# Patient Record
Sex: FEMALE | Race: WHITE | NOT HISPANIC OR LATINO | Employment: FULL TIME
[De-identification: names, ages, dates, MRNs, and addresses within clinical notes are randomized per-mention and may not be internally consistent; named-entity substitution may affect disease eponyms.]

---

## 2017-08-28 ENCOUNTER — RECORDS - HEALTHEAST (OUTPATIENT)
Dept: ADMINISTRATIVE | Facility: OTHER | Age: 32
End: 2017-08-28

## 2017-09-15 ENCOUNTER — TRANSFERRED RECORDS (OUTPATIENT)
Dept: HEALTH INFORMATION MANAGEMENT | Facility: CLINIC | Age: 32
End: 2017-09-15

## 2017-09-18 ENCOUNTER — HOSPITAL ENCOUNTER (OUTPATIENT)
Dept: BEHAVIORAL HEALTH | Facility: CLINIC | Age: 32
End: 2017-09-18
Attending: PSYCHIATRY & NEUROLOGY
Payer: MEDICAID

## 2017-09-18 PROCEDURE — 10020000 ZZH LODGING PLUS FACILITY CHARGE ADULT

## 2017-09-18 RX ORDER — LORATADINE 10 MG/1
10 TABLET ORAL DAILY PRN
COMMUNITY
End: 2017-10-14

## 2017-09-18 RX ORDER — MAGNESIUM HYDROXIDE/ALUMINUM HYDROXICE/SIMETHICONE 120; 1200; 1200 MG/30ML; MG/30ML; MG/30ML
30 SUSPENSION ORAL EVERY 6 HOURS PRN
COMMUNITY
End: 2017-10-14

## 2017-09-18 RX ORDER — METHYLPREDNISOLONE 4 MG
32 TABLET, DOSE PACK ORAL
COMMUNITY
End: 2017-10-14

## 2017-09-18 RX ORDER — LACTULOSE 10 G/15ML
30 SOLUTION ORAL 2 TIMES DAILY PRN
COMMUNITY
End: 2017-10-14

## 2017-09-18 RX ORDER — OMEPRAZOLE 40 MG/1
40 CAPSULE, DELAYED RELEASE ORAL
COMMUNITY
End: 2018-06-09

## 2017-09-18 RX ORDER — AMOXICILLIN 250 MG
2 CAPSULE ORAL DAILY PRN
COMMUNITY
End: 2017-10-14

## 2017-09-18 RX ORDER — LACTULOSE 10 G/15ML
10 SOLUTION ORAL
COMMUNITY
End: 2017-10-14

## 2017-09-18 ASSESSMENT — ANXIETY QUESTIONNAIRES
4. TROUBLE RELAXING: SEVERAL DAYS
6. BECOMING EASILY ANNOYED OR IRRITABLE: SEVERAL DAYS
7. FEELING AFRAID AS IF SOMETHING AWFUL MIGHT HAPPEN: MORE THAN HALF THE DAYS
GAD7 TOTAL SCORE: 8
5. BEING SO RESTLESS THAT IT IS HARD TO SIT STILL: SEVERAL DAYS
1. FEELING NERVOUS, ANXIOUS, OR ON EDGE: SEVERAL DAYS
3. WORRYING TOO MUCH ABOUT DIFFERENT THINGS: SEVERAL DAYS
2. NOT BEING ABLE TO STOP OR CONTROL WORRYING: SEVERAL DAYS

## 2017-09-18 ASSESSMENT — PAIN SCALES - GENERAL: PAINLEVEL: NO PAIN (0)

## 2017-09-18 ASSESSMENT — PATIENT HEALTH QUESTIONNAIRE - PHQ9: SUM OF ALL RESPONSES TO PHQ QUESTIONS 1-9: 11
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
## 2017-09-18 NOTE — PROGRESS NOTES
Name: Tiffany Vargas  Date: 9/18/2017  Medical Record: [id number]    Envelope Number: [id number]    List of Contents (List each item separately in new row):   Cell phone    Admission:  I am responsible for any personal items that are not sent to the safe or pharmacy.  [facility] is not responsible for loss, theft or damage of any property in my possession.      Patient Signature:  ___________________________________________       Date/Time:__________________________    Staff Signature: __________________________________       Date/Time:__________________________    2nd Staff person, if patient is unable/unwilling to sign:      __________________________________________________________       Date/Time: __________________________      Discharge:  [facility] has returned all of my personal belongings:    Patient Signature: ________________________________________     Date/Time: ____________________________________    Staff Signature: ______________________________________     Date/Time:_____________________________________\

## 2017-09-18 NOTE — PROGRESS NOTES
9/18/2017 I did have a tel call with her  of over a year, they were  2/10/16. He, is 50, and been sober since the age of 22. He has been concerned about her drinking since they met. He has been trying to get her to stop, she slows down now and then but then resumes her normal pattern which is about 1/2 gallon vodka over 3 days. He said she has high tolerance and doesn't show it. If he has advanced notification he would like to come to family week. He needs several days prior notification so that he can change his truck route to be in town for her family week. He said her father, a dentist, is also very supportive. However, she gets discouraged whenever she talks to him because he tells her she will die if she keeps drinking. A)  sounds very supportive P) Please notify  as soon as possible about the dates for family week. Ramy FERRER, [facility]
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
## 2017-09-18 NOTE — PROGRESS NOTES
[facility] ADMISSION UPDATE       Date of update: Update Evaluation Counselor:   9/18/2017     Ramy Burnham, [facility]     Date of original CD evaluation: Original Evaluation Counselor:   9/18/2017     Ramy Burnham     PHQ Score 10 or greater:   Yes  11       JOCE-7 Score 10 or greater:   No  8         Suicide Screening Questions:   1. Are you feeling hopeless about the present/future?     Yes, If yes explain: She has fears that she might die from her liver disease.    2. Have you ever had thoughts about taking your life?     No   3. When did you have these thoughts?     NA   4. Do you have any current intent or active desire to take your life?     No   5. Do you have a plan to take your life?     No   6. Have you ever made a suicide attempt?     No   7. Do you have access to pills, guns or other methods to kill yourself?     No     Guide to Risk Ratings   IDEATION: Active thoughts of suicide? INTENT: Intent to follow on suicide? PLAN: Plan to follow through on suicide? Level of Risk:   IF Yes Yes Yes Patient = High Emergent   IF Yes Yes No Patient = High Urgent/Non-Emergent   IF Yes No No Patient = Moderate Non-Urgent   IF No No   No Patient = Low Risk   The patient's ADDITIONAL RISK FACTORS and lack of PROTECTIVE FACTORS may increase their overall suicide risk ratings.     Patient's Responses (within the last 30 days)   IDEATION: Active thoughts of suicide?    No     INTENT: Intent to follow on suicide?    No     PLAN: Plan to follow through on suicide?    No     Determining the level of risk depends on the patient responses, suicide risk factors and protective factors.         Additional Risk Factors: Significant history of having untreated or poorly treated mental health symptoms  Significant history of physical illness or chronic medical problems  Tendency to be socially isolated and/or cut off from the support of others  Significant history of trauma and/or abuse issues  No permanent address. Lives with her   in the sleeper cab of his over the road semi.   Protective Factors:  She lives with her  of one year who is 51, an over road , whom she said is supportive. But they have lived in the same truck for the last year and he didn't appear concerned that she was drinking 1/5th bottle of HL a day.         Current AMARA: Current UA:     .000       Negative for all screened drugs.         Alcohol/Drug use since the last CD evaluation (include date and time of last use):     No additional substances use since the last CD evaluation       Please note any other clinical changes since the last CD evaluation (such as medication changes, additional legal charges, detoxification admissions, overdoses, etc.)     No significant changes since the last CD evaluation         Current ASAM Dimensions   I.) Intoxication and Withdrawal: 0   II.) Biomedical:  2   III.) Emotional and Behavioral:  2   IV.) Readiness to Change:  1   V.) Relapse Potential: 4   VI.) Recovery Environmental: 4

## 2017-09-18 NOTE — PROGRESS NOTES
Initial Services Plan        Before your first treatment group, please do the following    Immediate health & safety concerns: Go to the emergency room if you start to have withdrawal symptoms.  Look for sober housing and a supportive social network.  Look for a support network (such as AA, NA, DBT group, a Rastafarian group, etc.)    Suggestions for client during the time between intake & completion of treatment plan:  Tour your treatment center (unit or outpatient clinic).  Introduce yourself to the treatment group.  Spend time getting to know your peers.  Review your patient or client handbook.    Client issues to be addressed in the first treatment sessions:  Other Talk about her concerns that she may die because of her liver disease.       LUPE Hanks  9/18/2017  7:35 AM

## 2017-09-18 NOTE — PROGRESS NOTES
Name: Tiffany Vargas  Date: 9/18/2017  Medical Record: [id number]    Envelope Number: [id number]    List of Contents (List each item separately in new row):   Methylprednisone 8mg Tabs.     Admission:  I am responsible for any personal items that are not sent to the safe or pharmacy.  [facility] is not responsible for loss, theft or damage of any property in my possession.      Patient Signature:  ___________________________________________       Date/Time:__________________________    Staff Signature: __________________________________       Date/Time:__________________________    2nd Staff person, if patient is unable/unwilling to sign:      __________________________________________________________       Date/Time: __________________________      Discharge:  [facility] has returned all of my personal belongings:    Patient Signature: ________________________________________     Date/Time: ____________________________________    Staff Signature: ______________________________________     Date/Time:_____________________________________

## 2017-09-19 ENCOUNTER — HOSPITAL ENCOUNTER (OUTPATIENT)
Dept: BEHAVIORAL HEALTH | Facility: CLINIC | Age: 32
End: 2017-09-19
Attending: PSYCHIATRY & NEUROLOGY
Payer: MEDICAID

## 2017-09-19 ENCOUNTER — HOSPITAL ENCOUNTER (OUTPATIENT)
Dept: BEHAVIORAL HEALTH | Facility: CLINIC | Age: 32
End: 2017-09-19
Attending: FAMILY MEDICINE
Payer: MEDICAID

## 2017-09-19 PROCEDURE — 10020000 ZZH LODGING PLUS FACILITY CHARGE ADULT

## 2017-09-19 PROCEDURE — H2035 A/D TX PROGRAM, PER HOUR: HCPCS | Mod: HQ

## 2017-09-19 ASSESSMENT — ANXIETY QUESTIONNAIRES: GAD7 TOTAL SCORE: 8

## 2017-09-19 NOTE — PROGRESS NOTES
This writer reaching out to [facility]  at [phone] to see if she could assist getting prior auth initiated for medication rifaximin.  Contact number for [facility] left on VM
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
## 2017-09-19 NOTE — PROGRESS NOTES
9/19/17  Pt entered the [facility] unit, on 9/18/17, attended her first women's group this am, introduced herself to peers and participated in the group session.  Pt was provided basic materials to include a Big Book, treatment goal sheet and a Patient Safety Plan template.  Pt appears open and willing for treatment at this time. Pt to continue program and meet 1.1 with counselor for discussion and development of treatment plan.
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
## 2017-09-20 ENCOUNTER — HOSPITAL ENCOUNTER (OUTPATIENT)
Dept: BEHAVIORAL HEALTH | Facility: CLINIC | Age: 32
End: 2017-09-20
Attending: PSYCHIATRY & NEUROLOGY
Payer: MEDICAID

## 2017-09-20 ENCOUNTER — HOSPITAL ENCOUNTER (OUTPATIENT)
Dept: BEHAVIORAL HEALTH | Facility: CLINIC | Age: 32
End: 2017-09-20
Attending: FAMILY MEDICINE
Payer: MEDICAID

## 2017-09-20 PROCEDURE — 10020000 ZZH LODGING PLUS FACILITY CHARGE ADULT

## 2017-09-20 PROCEDURE — H2035 A/D TX PROGRAM, PER HOUR: HCPCS

## 2017-09-20 PROCEDURE — H2035 A/D TX PROGRAM, PER HOUR: HCPCS | Mod: HQ

## 2017-09-20 NOTE — PROGRESS NOTES
9/20/17  Pt and counselor met for discussion and development of her treatment plan.  Counselor reviewed Rule 25 assessment, treatment plan goal sheet and additional materials. Pt appears open and willing to gain insight and make changes needed to support a recovery lifestyle.  Issues Pt would like to address include shame, self esteem, boundaries, grief/loss, relapse prevention, loneliness and depression.  Pt to follow treatment plan.

## 2017-09-20 NOTE — PROGRESS NOTES
Patient Safety Plan Template    Name:   Tiffany Vargas YOB: 1985 Age:  32 year old MR Number:  [id number]   Step 1: Warning signs (Thoughts, images, mood, situation, behavior) that a crisis may be developin.  Become very quiet     2.  Am very anxious     3.  Act very irritated     Step 2: Internal coping strategies - Things I can do to take my mind off of my problems without contacting another person (relaxation technique, physical activity):     1.  Watching TV     2.  Reading a Book     3.  Taking a Walk     Step 3: People and social settings that provide distraction:     1. Name:  Scooter Vargas   Phone: [phone]   2. Name:  Yue Lubin   Phone: [phone]   3. Place: Park   4. Place:  Shopping Center     The one thing that is most important to me and worth living for is:      Step 4: People whom I can ask for help:     1. Name: Jhoan Crenshaw   Phone: [phone]     2. Name:  Scooter Vargas    Phone: [phone]     3. Name:  Yue Lubin Phone: [phone]     Step 5: Professionals or agencies I can contact during a crisis:     1. Clinician Name: seeking a medical Dr    Phone:    Clinician Pager or Emergency Contact #:      2. Clinician Name: none  Phone:      Clinician Pager or Emergency Contact #:      3. Local Urgent Care Services:  Non comp    Urgent Care Services Address:non comp    Urgent Care Services Phone: non comp     4. Suicide [facility] Phone: [phone] ([phone])     Step 6: Making the environment safe:     1.   Do not return to reside in the truck     2.   Seek options for sober living     Safety Plan Template 2008 Viviana Reid is reprinted with the express permission of the authors.  No portion of the Safety Plan Template may be reproduced without the express, written permission.  You can contact the authors at bhs@[place].[facility] [place] or juanito@[email or web address] [place].

## 2017-09-20 NOTE — PROGRESS NOTES
Acknowledgement of Current Treatment Plan     1. I have reviewed my treatment plan with my therapist / counselor on 9/21/17. I agree with the plan as it is written in the electronic health record.    Name Signature   Tiffany Vargas    Name of Therapist / Counselor    Delvis ANDRADE      2. I have completed and reviewed my Safety Plan with my counselor and signed this on 9/20/17. I have been given the hard copy of this plan.    Patient signature:  ________________________________________________________________________    Signatures required for any additional Problems, Goals, and/or Interventions added to treatment plan:    I have been given a copy of the addition to my treatment plan in Dimension _____ on [date           ] and I agree with this as it is written in the electronic record.     Patient signature:   ________________________________________________________________________    I have been given a copy of the addition to my treatment plan in Dimension _____ on [date           ] and I agree with this as it is written in the electronic record.      Patient signature:   ________________________________________________________________________    I have been given a copy of the addition to my treatment plan in Dimension _____ on [date          ] and I agree with this as it is written in the electronic record.     Patient signature:   ________________________________________________________________________    I have been given a copy of the addition to my treatment plan in Dimension _____ on [date         ] and I agree with this as it is written in the electronic record.      Patient signature:   ________________________________________________________________________

## 2017-09-20 NOTE — PROGRESS NOTES
Name: Tiffany Vargas  Date: 9/20/2017  Medical Record: [id number]    Envelope Number: [id number]    List of Contents (List each item separately in new row):     Amoxicillin POT CLAVUL 875-125mg Tabs    Admission:  I am responsible for any personal items that are not sent to the safe or pharmacy.  [facility] is not responsible for loss, theft or damage of any property in my possession.      Patient Signature:  ___________________________________________       Date/Time:__________________________    Staff Signature: __________________________________       Date/Time:__________________________    2nd Staff person, if patient is unable/unwilling to sign:      __________________________________________________________       Date/Time: __________________________      Discharge:  [facility] has returned all of my personal belongings:    Patient Signature: ________________________________________     Date/Time: ____________________________________    Staff Signature: ______________________________________     Date/Time:_____________________________________

## 2017-09-20 NOTE — PROGRESS NOTES
Name: Tiffany Vargas  Date: 9/19/2017  Medical Record: [id number]    Envelope Number: [id number]    List of Contents (List each item separately in new row):   Methylprednisolone 8mg 1 bottle    Admission:  I am responsible for any personal items that are not sent to the safe or pharmacy.  [facility] is not responsible for loss, theft or damage of any property in my possession.      Patient Signature:  ___________________________________________       Date/Time:__________________________    Staff Signature: __________________________________       Date/Time:__________________________    2nd Staff person, if patient is unable/unwilling to sign:      __________________________________________________________       Date/Time: __________________________      Discharge:  [facility] has returned all of my personal belongings:    Patient Signature: ________________________________________     Date/Time: ____________________________________    Staff Signature: ______________________________________     Date/Time:_____________________________________

## 2017-09-21 ENCOUNTER — HOSPITAL ENCOUNTER (OUTPATIENT)
Dept: BEHAVIORAL HEALTH | Facility: CLINIC | Age: 32
End: 2017-09-21
Attending: FAMILY MEDICINE
Payer: MEDICAID

## 2017-09-21 ENCOUNTER — OFFICE VISIT (OUTPATIENT)
Dept: BEHAVIORAL HEALTH | Facility: CLINIC | Age: 32
End: 2017-09-21

## 2017-09-21 VITALS
RESPIRATION RATE: 14 BRPM | SYSTOLIC BLOOD PRESSURE: 102 MMHG | WEIGHT: 118.5 LBS | TEMPERATURE: 99.2 F | DIASTOLIC BLOOD PRESSURE: 68 MMHG | OXYGEN SATURATION: 98 % | BODY MASS INDEX: 20.34 KG/M2 | HEART RATE: 107 BPM

## 2017-09-21 DIAGNOSIS — F51.01 PRIMARY INSOMNIA: ICD-10-CM

## 2017-09-21 DIAGNOSIS — K70.10 ALCOHOLIC HEPATITIS WITHOUT ASCITES (H): Primary | ICD-10-CM

## 2017-09-21 DIAGNOSIS — F10.90 ALCOHOL USE DISORDER: ICD-10-CM

## 2017-09-21 DIAGNOSIS — R17 JAUNDICE: ICD-10-CM

## 2017-09-21 PROBLEM — F19.20 CHEMICAL DEPENDENCY (H): Status: ACTIVE | Noted: 2017-09-21

## 2017-09-21 PROCEDURE — H2035 A/D TX PROGRAM, PER HOUR: HCPCS | Mod: HQ

## 2017-09-21 PROCEDURE — 99203 OFFICE O/P NEW LOW 30 MIN: CPT | Performed by: NURSE PRACTITIONER

## 2017-09-21 PROCEDURE — 90792 PSYCH DIAG EVAL W/MED SRVCS: CPT | Performed by: PSYCHIATRY & NEUROLOGY

## 2017-09-21 PROCEDURE — 10020000 ZZH LODGING PLUS FACILITY CHARGE ADULT

## 2017-09-21 RX ORDER — TRAZODONE HYDROCHLORIDE 50 MG/1
50 TABLET, FILM COATED ORAL
Qty: 90 TABLET | Refills: 1 | Status: SHIPPED | OUTPATIENT
Start: 2017-09-21 | End: 2017-09-22

## 2017-09-21 NOTE — PATIENT INSTRUCTIONS
We will let you know about your GI appointment  NO Tylenol  Continue Lactulose until you see GI  OK to hold Rifaximin for now  We started Trazodone as needed for sleep
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
## 2017-09-21 NOTE — MR AVS SNAPSHOT
After Visit Summary   9/21/2017    Tiffany Vargas    MRN: [id number]           Patient Information     Date Of Birth          1985        Visit Information        Provider Department      9/21/2017 10:30 AM Salud Greene APRN CNP [facility]        Today's Diagnoses     Alcoholic hepatitis without ascites    -  1    Jaundice        Primary insomnia          Care Instructions    We will let you know about your GI appointment  NO Tylenol  Continue Lactulose until you see GI  OK to hold Rifaximin for now  We started Trazodone as needed for sleep          Follow-ups after your visit        Additional Services     GASTROENTEROLOGY ADULT REF CONSULT ONLY       Preferred Location: [facility] [phone]      Please be aware that coverage of these services is subject to the terms and limitations of your health insurance plan.  Call member services at your health plan with any benefit or coverage questions.  Any procedures must be performed at a [facility] facility OR coordinated by your clinic's referral office.    Please bring the following with you to your appointment:    (1) Any X-Rays, CTs or MRIs which have been performed.  Contact the facility where they were done to arrange for  prior to your scheduled appointment.    (2) List of current medications   (3) This referral request   (4) Any documents/labs given to you for this referral                  Your next 10 appointments already scheduled     Sep 22, 2017  7:45 AM CDT   Treatment with ADULT [facility]   [facility] ([facility])    [address] [place] [address]   [phone]            Sep 23, 2017  7:45 AM CDT   Treatment with ADULT LODGING PLUS E   [facility] ([facility])    [address] [place] [address]   [phone]            Sep 24,  2017  7:45 AM CDT   Treatment with ADULT LODGING PLUS E   [facility] ([facility])    [facility] [address] [address]   [phone]            Sep 25, 2017  7:45 AM CDT   Treatment with ADULT LODGING PLUS E   [facility] ([facility])    [address] [place] [address]   [phone]            Sep 26, 2017  7:45 AM CDT   Treatment with ADULT LODGING PLUS E   [facility] ([facility])    [address] [place] [address]   [phone]            Sep 27, 2017  7:45 AM CDT   Treatment with ADULT LODGING PLUS E   [facility] ([facility])    [address] [place] [address]   [phone]            Sep 28, 2017  7:45 AM CDT   Treatment with ADULT LODGING PLUS E   [facility] ([facility])    [address] [place] [address]   [phone]            Sep 29, 2017  7:45 AM CDT   Treatment with ADULT LODGING PLUS E   [facility])    [address] [place] [address]   [phone]            Sep 30, 2017  7:45 AM CDT   Treatment with ADULT LODGING PLUS E   [facility] ([facility])    [address] [place] [address]   [phone]            Oct 01, 2017  7:45 AM CDT   Treatment with ADULT LODGING PLUS E   [facility] ([facility])    [address] [place] [address]   [phone]              Who to contact     If you have questions or need follow up information about today's clinic visit  "or your schedule please contact [facility] directly at [phone].  Normal or non-critical lab and imaging results will be communicated to you by MyChart, letter or phone within 4 business days after the clinic has received the results. If you do not hear from us within 7 days, please contact the clinic through [employer] or phone. If you have a critical or abnormal lab result, we will notify you by phone as soon as possible.  Submit refill requests through [employer] or call your pharmacy and they will forward the refill request to us. Please allow 3 business days for your refill to be completed.          Additional Information About Your Visit        [employer] Information     [employer] lets you send messages to your doctor, view your test results, renew your prescriptions, schedule appointments and more. To sign up, go to [email or web address] . Click on \"Log in\" on the left side of the screen, which will take you to the Welcome page. Then click on \"Sign up Now\" on the right side of the page.     You will be asked to enter the access code listed below, as well as some personal information. Please follow the directions to create your username and password.     Your access code is: [email or web address]  Expires: 2017 11:06 AM     Your access code will  in 90 days. If you need help or a new code, please call your [facility] clinic or [phone].        Care EveryWhere ID     This is your Care EveryWhere ID. This could be used by other organizations to access your [facility] medical records  [id number]        Your Vitals Were     Pulse Temperature Respirations Pulse Oximetry BMI (Body Mass Index)       107 99.2  F (37.3  C) (Oral) 14 98% 20.34 kg/m2        Blood Pressure from Last 3 Encounters:   17 102/68   17 116/68    Weight from Last 3 Encounters:   17 118 lb 8 oz (53.8 kg)   17 118 lb 9.6 oz (53.8 kg)              We Performed the Following     GASTROENTEROLOGY " ADULT REF CONSULT ONLY          Today's Medication Changes          These changes are accurate as of: 9/21/17 11:06 AM.  If you have any questions, ask your nurse or doctor.               Start taking these medicines.        Dose/Directions    traZODone 50 MG tablet   Commonly known as:  DESYREL   Used for:  Primary insomnia   Started by:  Salud Greene APRN CNP        Dose:  50 mg   Take 1 tablet (50 mg) by mouth nightly as needed for sleep   Quantity:  90 tablet   Refills:  1            Where to get your medicines      These medications were sent to [facility] [place], [place] - [address]  [address], [facility] [address]     Phone:  [phone]     traZODone 50 MG tablet                Primary Care Provider    None Specified       No primary provider on file.        Equal Access to Services     SONIA SIMONS : Oscar Rosales, watad luqadaha, qaybta kaalmada ademargaretyajosé manuel, genevieve cardenas . So [facility] [phone].    ATENCIÓN: Si habla español, tiene a shankar disposición servicios gratuitos de asistencia lingüística. Llame al [phone].    We comply with applicable federal civil rights laws and [place] laws. We do not discriminate on the basis of race, color, national origin, age, disability sex, sexual orientation or gender identity.            Thank you!     Thank you for choosing [facility]  for your care. Our goal is always to provide you with excellent care. Hearing back from our patients is one way we can continue to improve our services. Please take a few minutes to complete the written survey that you may receive in the mail after your visit with us. Thank you!             Your Updated Medication List - Protect others around you: Learn how to safely use, store and throw away your medicines at www.disposemymeds.org.          This list is accurate as of: 9/21/17 11:06 AM.  Always use your most  recent med list.                   Brand Name Dispense Instructions for use Diagnosis    alum & mag hydroxide-simethicone 200-200-20 MG/5ML Susp suspension    MYLANTA/MAALOX     Take 30 mLs by mouth every 6 hours as needed for indigestion        amoxicillin-clavulanate 875-125 MG per tablet    AUGMENTIN     Take 1 tablet by mouth 2 times daily        GABAPENTIN PO      Take 300 mg by mouth 3 times daily        guaiFENesin 20 mg/mL Soln solution    ROBITUSSIN     Take 10 mLs by mouth every 4 hours as needed for cough        HYDROXYZINE HCL PO      Take 25 mg by mouth 3 times daily        HYDROXYZINE PAMOATE PO      Take 25 mg by mouth 4 times daily as needed        IBUPROFEN PO      Take 200-400 mg by mouth every 6 hours as needed for moderate pain        * lactulose 10 GM/15ML solution    CHRONULAC     Take 30 mLs by mouth 2 times daily as needed for constipation        * lactulose 10 GM/15ML solution    CHRONULAC     Take 10 g by mouth daily (before lunch)        loratadine 10 MG tablet    CLARITIN     Take 10 mg by mouth daily as needed for allergies        MELATONIN PO      Take 3 mg by mouth nightly as needed        * methylPREDNISolone 4 MG tablet    MEDROL DOSEPAK     Take 32 mg by mouth daily (with breakfast) follow package directions        * MEDROL PO      Take 8 mg by mouth daily Follow taper instructions on bottle        METOPROLOL TARTRATE PO      Take 25 mg by mouth 2 times daily        omeprazole 40 MG capsule    priLOSEC     Take 40 mg by mouth every morning (before breakfast)        phenol-menthol 14.5 MG lozenge      Place 1 lozenge inside cheek every 2 hours as needed for moderate pain        senna-docusate 8.6-50 MG per tablet    SENOKOT-S;PERICOLACE     Take 2 tablets by mouth daily as needed for constipation        traZODone 50 MG tablet    DESYREL    90 tablet    Take 1 tablet (50 mg) by mouth nightly as needed for sleep    Primary insomnia       URSODIOL PO      Take 300 mg by mouth 3 times  daily (with meals)        * Notice:  This list has 4 medication(s) that are the same as other medications prescribed for you. Read the directions carefully, and ask your doctor or other care provider to review them with you.

## 2017-09-22 ENCOUNTER — HOSPITAL ENCOUNTER (OUTPATIENT)
Dept: BEHAVIORAL HEALTH | Facility: CLINIC | Age: 32
End: 2017-09-22
Attending: FAMILY MEDICINE
Payer: MEDICAID

## 2017-09-22 ENCOUNTER — TELEPHONE (OUTPATIENT)
Dept: FAMILY MEDICINE | Facility: CLINIC | Age: 32
End: 2017-09-22

## 2017-09-22 DIAGNOSIS — F51.01 PRIMARY INSOMNIA: ICD-10-CM

## 2017-09-22 PROCEDURE — H2035 A/D TX PROGRAM, PER HOUR: HCPCS | Mod: HQ

## 2017-09-22 PROCEDURE — 10020000 ZZH LODGING PLUS FACILITY CHARGE ADULT

## 2017-09-22 RX ORDER — TRAZODONE HYDROCHLORIDE 50 MG/1
50 TABLET, FILM COATED ORAL
Qty: 90 TABLET | Refills: 1 | Status: SHIPPED | OUTPATIENT
Start: 2017-09-22

## 2017-09-22 NOTE — TELEPHONE ENCOUNTER
Chaya macedo called, Salud's not authorize to prescribe for pt with Medicaid.  Please have a different provider prescribe.      Hau Waldrop

## 2017-09-22 NOTE — PROGRESS NOTES
9/22/17  Pt shared an assignment re-framing negative self talk statements to positive and identifying some of her accomplishments, strengths and qualities.  Pt identified positives to include being compassionate, kind, loving, resilient, strong and loyal.  Pt was given feedback and support from peers and counselors.  Pt to continue to practice positive affirmations daily.

## 2017-09-23 ENCOUNTER — HOSPITAL ENCOUNTER (OUTPATIENT)
Dept: BEHAVIORAL HEALTH | Facility: CLINIC | Age: 32
End: 2017-09-23
Attending: FAMILY MEDICINE
Payer: MEDICAID

## 2017-09-23 PROCEDURE — H2035 A/D TX PROGRAM, PER HOUR: HCPCS | Mod: HQ

## 2017-09-23 PROCEDURE — 10020000 ZZH LODGING PLUS FACILITY CHARGE ADULT

## 2017-09-24 ENCOUNTER — HOSPITAL ENCOUNTER (OUTPATIENT)
Dept: BEHAVIORAL HEALTH | Facility: CLINIC | Age: 32
End: 2017-09-24
Attending: FAMILY MEDICINE
Payer: MEDICAID

## 2017-09-24 PROCEDURE — 10020000 ZZH LODGING PLUS FACILITY CHARGE ADULT

## 2017-09-24 PROCEDURE — H2035 A/D TX PROGRAM, PER HOUR: HCPCS | Mod: HQ

## 2017-09-25 ENCOUNTER — HOSPITAL ENCOUNTER (OUTPATIENT)
Dept: BEHAVIORAL HEALTH | Facility: CLINIC | Age: 32
End: 2017-09-25
Attending: FAMILY MEDICINE
Payer: MEDICAID

## 2017-09-25 PROCEDURE — 10020000 ZZH LODGING PLUS FACILITY CHARGE ADULT

## 2017-09-25 PROCEDURE — H2035 A/D TX PROGRAM, PER HOUR: HCPCS | Mod: HQ

## 2017-09-26 ENCOUNTER — HOSPITAL ENCOUNTER (OUTPATIENT)
Dept: BEHAVIORAL HEALTH | Facility: CLINIC | Age: 32
End: 2017-09-26
Attending: FAMILY MEDICINE
Payer: MEDICAID

## 2017-09-26 PROCEDURE — 10020000 ZZH LODGING PLUS FACILITY CHARGE ADULT

## 2017-09-26 PROCEDURE — H2035 A/D TX PROGRAM, PER HOUR: HCPCS | Mod: HQ
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
## 2017-09-27 ENCOUNTER — HOSPITAL ENCOUNTER (OUTPATIENT)
Dept: BEHAVIORAL HEALTH | Facility: CLINIC | Age: 32
End: 2017-09-27
Attending: FAMILY MEDICINE
Payer: MEDICAID

## 2017-09-27 PROCEDURE — H2035 A/D TX PROGRAM, PER HOUR: HCPCS | Mod: HQ

## 2017-09-27 PROCEDURE — 10020000 ZZH LODGING PLUS FACILITY CHARGE ADULT
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
## 2017-09-28 ENCOUNTER — HOSPITAL ENCOUNTER (OUTPATIENT)
Dept: BEHAVIORAL HEALTH | Facility: CLINIC | Age: 32
End: 2017-09-28
Attending: FAMILY MEDICINE
Payer: MEDICAID

## 2017-09-28 PROCEDURE — 10020000 ZZH LODGING PLUS FACILITY CHARGE ADULT

## 2017-09-28 PROCEDURE — H2035 A/D TX PROGRAM, PER HOUR: HCPCS | Mod: HQ

## 2017-09-28 NOTE — PROGRESS NOTES
9/29/17  Pt and counselor met 1.1 and discussed the option of [facility] outpatient programming and partnering sober housing.  Pt was provided a list of partnering sober houses and plans to begin to make contact about bed availability.  Counselor to provide requested information to [facility] outpatient program.  To set up intake appointment for [facility].

## 2017-09-29 ENCOUNTER — HOSPITAL ENCOUNTER (OUTPATIENT)
Dept: BEHAVIORAL HEALTH | Facility: CLINIC | Age: 32
End: 2017-09-29
Attending: FAMILY MEDICINE
Payer: MEDICAID

## 2017-09-29 PROCEDURE — 10020000 ZZH LODGING PLUS FACILITY CHARGE ADULT

## 2017-09-29 PROCEDURE — H2035 A/D TX PROGRAM, PER HOUR: HCPCS | Mod: HQ

## 2017-09-29 NOTE — PROGRESS NOTES
Name: Tiffany Vargas  Date: 9/29/2017  Medical Record: [id number]    Envelope Number: [id number]    List of Contents (List each item separately in new row):   Methylprednisolone 4mg tabs 1bottle    Admission:  I am responsible for any personal items that are not sent to the safe or pharmacy.  [facility] is not responsible for loss, theft or damage of any property in my possession.      Patient Signature:  ___________________________________________       Date/Time:__________________________    Staff Signature: __________________________________       Date/Time:__________________________    2nd Staff person, if patient is unable/unwilling to sign:      __________________________________________________________       Date/Time: __________________________      Discharge:  [facility] has returned all of my personal belongings:    Patient Signature: ________________________________________     Date/Time: ____________________________________    Staff Signature: ______________________________________     Date/Time:_____________________________________

## 2017-09-30 ENCOUNTER — HOSPITAL ENCOUNTER (OUTPATIENT)
Dept: BEHAVIORAL HEALTH | Facility: CLINIC | Age: 32
End: 2017-09-30
Attending: FAMILY MEDICINE
Payer: MEDICAID

## 2017-09-30 PROCEDURE — H2035 A/D TX PROGRAM, PER HOUR: HCPCS | Mod: HQ

## 2017-09-30 PROCEDURE — 10020000 ZZH LODGING PLUS FACILITY CHARGE ADULT

## 2017-10-01 ENCOUNTER — HOSPITAL ENCOUNTER (OUTPATIENT)
Dept: BEHAVIORAL HEALTH | Facility: CLINIC | Age: 32
End: 2017-10-01
Attending: FAMILY MEDICINE
Payer: MEDICAID

## 2017-10-01 PROCEDURE — 10020000 ZZH LODGING PLUS FACILITY CHARGE ADULT

## 2017-10-01 PROCEDURE — H2035 A/D TX PROGRAM, PER HOUR: HCPCS | Mod: HQ

## 2017-10-02 ENCOUNTER — HOSPITAL ENCOUNTER (OUTPATIENT)
Dept: BEHAVIORAL HEALTH | Facility: CLINIC | Age: 32
End: 2017-10-02
Attending: FAMILY MEDICINE
Payer: MEDICAID

## 2017-10-02 PROCEDURE — 10020000 ZZH LODGING PLUS FACILITY CHARGE ADULT

## 2017-10-02 PROCEDURE — H2035 A/D TX PROGRAM, PER HOUR: HCPCS | Mod: HQ

## 2017-10-02 PROCEDURE — H2035 A/D TX PROGRAM, PER HOUR: HCPCS
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
## 2017-10-03 ENCOUNTER — HOSPITAL ENCOUNTER (OUTPATIENT)
Dept: BEHAVIORAL HEALTH | Facility: CLINIC | Age: 32
End: 2017-10-03
Attending: FAMILY MEDICINE
Payer: MEDICAID

## 2017-10-03 ENCOUNTER — TRANSFERRED RECORDS (OUTPATIENT)
Dept: HEALTH INFORMATION MANAGEMENT | Facility: CLINIC | Age: 32
End: 2017-10-03

## 2017-10-03 PROCEDURE — H2035 A/D TX PROGRAM, PER HOUR: HCPCS | Mod: HQ

## 2017-10-03 PROCEDURE — 10020000 ZZH LODGING PLUS FACILITY CHARGE ADULT

## 2017-10-04 ENCOUNTER — HOSPITAL ENCOUNTER (OUTPATIENT)
Dept: BEHAVIORAL HEALTH | Facility: CLINIC | Age: 32
End: 2017-10-04
Attending: FAMILY MEDICINE
Payer: MEDICAID

## 2017-10-04 PROCEDURE — 10020000 ZZH LODGING PLUS FACILITY CHARGE ADULT

## 2017-10-04 PROCEDURE — H2035 A/D TX PROGRAM, PER HOUR: HCPCS | Mod: HQ

## 2017-10-05 ENCOUNTER — HOSPITAL ENCOUNTER (OUTPATIENT)
Dept: BEHAVIORAL HEALTH | Facility: CLINIC | Age: 32
End: 2017-10-05
Attending: FAMILY MEDICINE
Payer: MEDICAID

## 2017-10-05 PROCEDURE — H2035 A/D TX PROGRAM, PER HOUR: HCPCS | Mod: HQ

## 2017-10-05 PROCEDURE — 10020000 ZZH LODGING PLUS FACILITY CHARGE ADULT

## 2017-10-06 ENCOUNTER — HOSPITAL ENCOUNTER (OUTPATIENT)
Dept: BEHAVIORAL HEALTH | Facility: CLINIC | Age: 32
End: 2017-10-06
Attending: FAMILY MEDICINE
Payer: MEDICAID

## 2017-10-06 PROCEDURE — H2035 A/D TX PROGRAM, PER HOUR: HCPCS | Mod: HQ

## 2017-10-06 PROCEDURE — 10020000 ZZH LODGING PLUS FACILITY CHARGE ADULT

## 2017-10-06 NOTE — PROGRESS NOTES
Call received from a staff at [facility] (#[phone]). No MESFIN on file - writer obtained callback information and will inform patient that he said a bed is available and is wondering what Brisa's d/c date is.

## 2017-10-07 ENCOUNTER — HOSPITAL ENCOUNTER (OUTPATIENT)
Dept: BEHAVIORAL HEALTH | Facility: CLINIC | Age: 32
End: 2017-10-07
Attending: FAMILY MEDICINE
Payer: MEDICAID

## 2017-10-07 PROCEDURE — H2035 A/D TX PROGRAM, PER HOUR: HCPCS | Mod: HQ

## 2017-10-07 PROCEDURE — 10020000 ZZH LODGING PLUS FACILITY CHARGE ADULT

## 2017-10-08 ENCOUNTER — HOSPITAL ENCOUNTER (OUTPATIENT)
Dept: BEHAVIORAL HEALTH | Facility: CLINIC | Age: 32
End: 2017-10-08
Attending: FAMILY MEDICINE
Payer: MEDICAID

## 2017-10-08 PROCEDURE — 10020000 ZZH LODGING PLUS FACILITY CHARGE ADULT

## 2017-10-08 PROCEDURE — H2035 A/D TX PROGRAM, PER HOUR: HCPCS | Mod: HQ

## 2017-10-09 ENCOUNTER — HOSPITAL ENCOUNTER (OUTPATIENT)
Dept: BEHAVIORAL HEALTH | Facility: CLINIC | Age: 32
End: 2017-10-09
Attending: FAMILY MEDICINE
Payer: MEDICAID

## 2017-10-09 PROCEDURE — H2035 A/D TX PROGRAM, PER HOUR: HCPCS | Mod: HQ

## 2017-10-09 PROCEDURE — 10020000 ZZH LODGING PLUS FACILITY CHARGE ADULT

## 2017-10-10 ENCOUNTER — HOSPITAL ENCOUNTER (OUTPATIENT)
Dept: BEHAVIORAL HEALTH | Facility: CLINIC | Age: 32
End: 2017-10-10
Attending: FAMILY MEDICINE
Payer: MEDICAID

## 2017-10-10 PROCEDURE — H2035 A/D TX PROGRAM, PER HOUR: HCPCS | Mod: HQ

## 2017-10-10 PROCEDURE — 10020000 ZZH LODGING PLUS FACILITY CHARGE ADULT

## 2017-10-10 PROCEDURE — H2035 A/D TX PROGRAM, PER HOUR: HCPCS

## 2017-10-10 PROCEDURE — 99212 OFFICE O/P EST SF 10 MIN: CPT | Performed by: PSYCHIATRY & NEUROLOGY

## 2017-10-10 NOTE — PROGRESS NOTES
"INDIVIDUAL SESSION SUMMARY    D) Met with client on 10/10/17 from 12:30-1:20. \"anxious and irritable\" as her discharge date is nearing and she will be moving into her sober house. Client spoke about her difficulty with change and how she is anxious about moving into a new environment. Client stated \"it's terrifying for me\" and \"I don't know how I'm going to do it\" when talking about not having any familial support or friends in [place]. Therapist reminded client that she does have the ability to ask for help and client remembered times in which she was able to speak up and ask for help. Client spoke about her difficulty discerning who she can trust and how it's been a ling time, since college, when she attempted to make friends. Therapist and client spoke of resources in the community where the client could seek food assistance, therapy or support groups. Client took information on the anxiety research studies available here in treatment and  Had the therapist call the nurse to schedule an appt to talk with a physician about medication assistance. Client stated that she will seek out a PT job while attending [employer] and continue with therapy.   I) Individual session with client. Provided client with verbal interventions including: validation, nurturing, support.   A) Client appears emotionally constricted and to lack skills for emotional regulation. Client appears to better understand her co-dependent traits and how that impacts her asking for what she needs. Client appears to lack a sober support network and meaningful activities for free time. Client appears to lack a daily routine and a sense of purpose.   P) No future sessions scheduled. Client has several therapist referrals to contact.   JENN Young  10/10/2017    "
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
## 2017-10-10 NOTE — PROGRESS NOTES
Interim history   This is a 32 year old female with alcohol use dx severe.Pt seen . Patient's mood is good  Energy Level:MODERATE  Sleep:No concerns, sleeps well through night  Appetite:normal motivation interest are improving   Denied any Suicidal/homicidal ideation/plan intent. Denied psychosis    Tolerating meds and has no side effects.         No prior suicide attempts      No past medical history on file.  She was recently hospitalized 8/31 -8/18 at [facility] for acute encephalopathy, alcoholic hepatitis and concern for sepsis and lactic acidosis. There were concerns for possible cholecystitis but after further evaluation it was determined that her presentation was more consistent with acute alcoholic hepatitis. She was treated with steroids as well as inpatient withdrawal protocol and gradually improved. She was seen by Hematology, Infectious Disease and GI. A hepatic US showed hepatomegaly with diffuse steatosis and gallbladder wall thickening. She was placed on Lactulose, Rifaximin and a low Na diet. She was instructed to follow up with [place] [facility] after treatment.  10 point ROS is negative except for jaundice  constitutional    HEENT - no symptoms   RESPIRATORY-no symptoms   CVS-no symptoms   GI-no symptoms  MUSCKULOSKELETAL -no symptoms  NEUROLOGICAL-no symptoms  ENDOCRINE-no symptoms  HEMATAOLOGY-no symptoms  SKIN-no symptoms  Family History   Problem Relation Age of Onset     Depression Mother      Anxiety Disorder Mother      Depression Maternal Grandmother      Bipolar Disorder Maternal Grandmother      Depression Sister      Social History     Social History     Marital status:      Spouse name: N/A     Number of children: N/A     Years of education: N/A     Occupational History     Not on file.     Social History Main Topics     Smoking status: Current Some Day Smoker     Types: Cigarettes     Smokeless tobacco: Former User     Alcohol use Not on file      Drug use: Not on file     Sexual activity: Not on file     Other Topics Concern     Not on file     Social History Narrative     FAMILY HISTORY:  Paternal grandfather had alcoholism.  Paternal uncle has alcoholism.  Sister has depression.       SOCIAL HISTORY:  She grew up in [place], good childhood, no abuse.  Support system consists of her  and father.  Stressors include money, family.        Medications:     Current Outpatient Prescriptions   Medication Sig     traZODone (DESYREL) 50 MG tablet Take 1 tablet (50 mg) by mouth nightly as needed for sleep     GABAPENTIN PO Take 300 mg by mouth 3 times daily     omeprazole (PRILOSEC) 40 MG capsule Take 40 mg by mouth every morning (before breakfast)     URSODIOL PO Take 300 mg by mouth 3 times daily (with meals)     METOPROLOL TARTRATE PO Take 25 mg by mouth 2 times daily     HYDROXYZINE HCL PO Take 25 mg by mouth 3 times daily      HYDROXYZINE PAMOATE PO Take 25 mg by mouth 4 times daily as needed      lactulose (CHRONULAC) 10 GM/15ML solution Take 30 mLs by mouth 2 times daily as needed for constipation     IBUPROFEN PO Take 200-400 mg by mouth every 6 hours as needed for moderate pain     alum & mag hydroxide-simethicone (MYLANTA/MAALOX) 200-200-20 MG/5ML SUSP suspension Take 30 mLs by mouth every 6 hours as needed for indigestion     guaiFENesin (ROBITUSSIN) 20 mg/mL SOLN solution Take 10 mLs by mouth every 4 hours as needed for cough     phenol-menthol (CEPASTAT) 14.5 MG lozenge Place 1 lozenge inside cheek every 2 hours as needed for moderate pain     loratadine (CLARITIN) 10 MG tablet Take 10 mg by mouth daily as needed for allergies     senna-docusate (SENOKOT-S;PERICOLACE) 8.6-50 MG per tablet Take 2 tablets by mouth daily as needed for constipation     MELATONIN PO Take 3 mg by mouth nightly as needed     lactulose (CHRONULAC) 10 GM/15ML solution Take 10 g by mouth daily (before lunch)     amoxicillin-clavulanate (AUGMENTIN) 875-125 MG per tablet  Take 1 tablet by mouth 2 times daily     methylPREDNISolone (MEDROL DOSEPAK) 4 MG tablet Take 32 mg by mouth daily (with breakfast) follow package directions     MethylPREDNISolone (MEDROL PO) Take 8 mg by mouth daily Follow taper instructions on bottle     No current facility-administered medications for this encounter.      Facility-Administered Medications Ordered in Other Encounters   Medication     Self Administer Medications: Behavioral Services        Current Outpatient Prescriptions on File Prior to Encounter:  traZODone (DESYREL) 50 MG tablet Take 1 tablet (50 mg) by mouth nightly as needed for sleep   GABAPENTIN PO Take 300 mg by mouth 3 times daily   omeprazole (PRILOSEC) 40 MG capsule Take 40 mg by mouth every morning (before breakfast)   URSODIOL PO Take 300 mg by mouth 3 times daily (with meals)   METOPROLOL TARTRATE PO Take 25 mg by mouth 2 times daily   HYDROXYZINE HCL PO Take 25 mg by mouth 3 times daily    HYDROXYZINE PAMOATE PO Take 25 mg by mouth 4 times daily as needed    lactulose (CHRONULAC) 10 GM/15ML solution Take 30 mLs by mouth 2 times daily as needed for constipation   IBUPROFEN PO Take 200-400 mg by mouth every 6 hours as needed for moderate pain   alum & mag hydroxide-simethicone (MYLANTA/MAALOX) 200-200-20 MG/5ML SUSP suspension Take 30 mLs by mouth every 6 hours as needed for indigestion   guaiFENesin (ROBITUSSIN) 20 mg/mL SOLN solution Take 10 mLs by mouth every 4 hours as needed for cough   phenol-menthol (CEPASTAT) 14.5 MG lozenge Place 1 lozenge inside cheek every 2 hours as needed for moderate pain   loratadine (CLARITIN) 10 MG tablet Take 10 mg by mouth daily as needed for allergies   senna-docusate (SENOKOT-S;PERICOLACE) 8.6-50 MG per tablet Take 2 tablets by mouth daily as needed for constipation   MELATONIN PO Take 3 mg by mouth nightly as needed   lactulose (CHRONULAC) 10 GM/15ML solution Take 10 g by mouth daily (before lunch)   amoxicillin-clavulanate (AUGMENTIN) 332-725  MG per tablet Take 1 tablet by mouth 2 times daily   methylPREDNISolone (MEDROL DOSEPAK) 4 MG tablet Take 32 mg by mouth daily (with breakfast) follow package directions   MethylPREDNISolone (MEDROL PO) Take 8 mg by mouth daily Follow taper instructions on bottle     Current Facility-Administered Medications on File Prior to Encounter:  Self Administer Medications: Behavioral Services          Allergies:     Allergies   Allergen Reactions     Sulfa Drugs Anaphylaxis     Childhood reaction            Psychiatric Examination:     Weight is 0 lbs 0 oz  There is no height or weight on file to calculate BMI.    Appearance:  awake, alert and adequately groomed  Attitude:  cooperative  Eye Contact:  good  Mood:  better  Affect:  appropriate and in normal range and mood congruent  Speech:  clear, coherent rate /rhythm are good  Psychomotor Behavior:  no evidence of tardive dyskinesia, dystonia, or tics and intact station, gait and muscle tone  Throught Process:  logical  Associations:  no loose associations  Thought Content:  no evidence of suicidal ideation or homicidal ideation, no evidence of psychotic thought, no auditory hallucinations present and no visual hallucinations present  Insight:  good  Judgement:  intact  Oriented to:  time, person, and place  Attention Span and Concentration:  intact  Recent and Remote Memory:  intact  Language fund of knowledge are adequate               Labs:   No results found for: NTBNPI, NTBNP  No results found for: WBC, HGB, HCT, MCV, PLT  No results found for: TSH        DIagnoses:   Alcohol use dx severe         Plan:   Continue present meds  F/u as per counselours    Able to give informed consent:  YES       Discussed Risks/Benefits/Side Effects/Alternatives: YES      Discussed with patient many issues of addiction,triggers/ relapse, and establishing a solid recovery program.

## 2017-10-11 ENCOUNTER — HOSPITAL ENCOUNTER (OUTPATIENT)
Dept: BEHAVIORAL HEALTH | Facility: CLINIC | Age: 32
End: 2017-10-11
Attending: FAMILY MEDICINE
Payer: MEDICAID

## 2017-10-11 PROCEDURE — H2035 A/D TX PROGRAM, PER HOUR: HCPCS | Mod: HQ

## 2017-10-11 PROCEDURE — 10020000 ZZH LODGING PLUS FACILITY CHARGE ADULT

## 2017-10-12 ENCOUNTER — HOSPITAL ENCOUNTER (OUTPATIENT)
Dept: BEHAVIORAL HEALTH | Facility: CLINIC | Age: 32
End: 2017-10-12
Attending: FAMILY MEDICINE
Payer: MEDICAID

## 2017-10-12 PROCEDURE — 10020000 ZZH LODGING PLUS FACILITY CHARGE ADULT

## 2017-10-12 PROCEDURE — H2035 A/D TX PROGRAM, PER HOUR: HCPCS | Mod: HQ

## 2017-10-13 ENCOUNTER — HOSPITAL ENCOUNTER (OUTPATIENT)
Dept: BEHAVIORAL HEALTH | Facility: CLINIC | Age: 32
End: 2017-10-13
Attending: FAMILY MEDICINE
Payer: MEDICAID

## 2017-10-13 PROCEDURE — H2035 A/D TX PROGRAM, PER HOUR: HCPCS | Mod: HQ

## 2017-10-13 PROCEDURE — 10020000 ZZH LODGING PLUS FACILITY CHARGE ADULT

## 2017-10-14 ENCOUNTER — HOSPITAL ENCOUNTER (OUTPATIENT)
Dept: BEHAVIORAL HEALTH | Facility: CLINIC | Age: 32
End: 2017-10-14
Attending: FAMILY MEDICINE
Payer: MEDICAID

## 2017-10-14 PROCEDURE — 10020000 ZZH LODGING PLUS FACILITY CHARGE ADULT

## 2017-10-14 PROCEDURE — H2035 A/D TX PROGRAM, PER HOUR: HCPCS | Mod: HQ

## 2017-10-14 RX ORDER — LACTULOSE 10 G/15ML
20 SOLUTION ORAL 3 TIMES DAILY
COMMUNITY
End: 2018-06-09

## 2017-10-14 NOTE — IP AVS SNAPSHOT
Medication List       Patient:  PRISCILLA OLIVEROS   :  1985   Physician:  (Not on file)           This is your record.  Keep this with you and show to your community pharmacist(s) and physician(s) at each visit.     Allergies:  SULFA DRUGS - Anaphylaxis               Medications  Valid as of: 2017 - 12:41 PM    Generic Name Brand Name Tablet Size Instructions for use    Gabapentin NEURONTIN 300 MG Take 1 capsule (300 mg) by mouth 3 times daily        HydrOXYzine HCl   Take 25 mg by mouth 3 times daily         HydrOXYzine HCl   Take 25 mg by mouth 4 times daily as needed for itching For anxiety        Lactulose CHRONULAC 10 GM/15ML Take 20 g by mouth 3 times daily Take 15 ml by mouth once daily before lunch and take 30 mls by mouth two times a day as needed (To produce 3-4 loose stools per day)        MethylPREDNISolone   Take 8 mg by mouth Take one-half  tab (4 mg) by mouth for 10/15 thru 10/17. Then STOP        Metoprolol Tartrate   Take 25 mg by mouth 2 times daily        Omeprazole priLOSEC 40 MG Take 40 mg by mouth every morning (before breakfast)        TraZODone HCl DESYREL 50 MG Take 1 tablet (50 mg) by mouth nightly as needed for sleep        Ursodiol   Take 300 mg by mouth 3 times daily (with meals)        .           .           .           .

## 2017-10-14 NOTE — PROGRESS NOTES
Nursing Discharge Planning Meeting    Writer completed discharge planning meeting with patient. Discharge is planned for Monday, 10/16/2017.    Discussed appropriate follow up care to manage RIANA/liver medical issues and to obtain medication refills. Patient given a copy of their current medications for reference. Questions answered and patient verbalized understanding of post-discharge follow up plan.  Patient has scheduled appt with Addiction Medicine Dr. Quiros and with GI MD.  Pt encouraged to obtain PCP    Continue to support patient in discharge planning as needed to assure appropriate continuity of care.     Essential Oil Therapy  Patient used essential oil therapy during treatment program: No   If yes:   Was the essential oil therapy effective for managing sleep, pain, anxiety, or mood? NA &   Did the essential oil therapy a trigger for any cravings or urges to use drugs or alcohol? NA

## 2017-10-14 NOTE — IP AVS SNAPSHOT
"                  MRN:[id number]                      After Visit Summary   10/14/2017    Tiffany Vargas    MRN: [id number]           Visit Information        Provider Department      10/14/2017  7:45 AM ADULT LODGING PLUS E [facility]        Your next 10 appointments already scheduled     Oct 15, 2017  7:45 AM CDT   Treatment with ADULT LODGING PLUS E   [facility] ([facility])    [address] [address]   [phone]            Oct 25, 2017  3:20 PM CDT   New Visit with Gosia Quiros MD   [facility]    [address]  [address]  [facility] [address]   [phone]              MyChart Information     [facility] lets you send messages to your doctor, view your test results, renew your prescriptions, schedule appointments and more. To sign up, go to [email or web address] . Click on \"Log in\" on the left side of the screen, which will take you to the Welcome page. Then click on \"Sign up Now\" on the right side of the page.     You will be asked to enter the access code listed below, as well as some personal information. Please follow the directions to create your username and password.     Your access code is: [email or web address]  Expires: 2017 11:06 AM     Your access code will  in 90 days. If you need help or a new code, please call your [facility] clinic or [phone].        Care EveryWhere ID     This is your Care EveryWhere ID. This could be used by other organizations to access your [facility] medical records  [id number]        Equal Access to Services     SONIA SIMONS AH: Hadii aad ku hadasho Soardhaali, waaxda luqadaha, qaybta kaalmada adeegyada, genevieve villalobos. So [facility] [phone].    ATENCIÓN: Si habla español, tiene a shankar disposición servicios gratuitos de asistencia lingüística. Llame " al [phone].    We comply with applicable federal civil rights laws and [place] laws. We do not discriminate on the basis of race, color, national origin, age, disability, sex, sexual orientation, or gender identity.

## 2017-10-15 ENCOUNTER — HOSPITAL ENCOUNTER (OUTPATIENT)
Dept: BEHAVIORAL HEALTH | Facility: CLINIC | Age: 32
End: 2017-10-15
Attending: FAMILY MEDICINE
Payer: MEDICAID

## 2017-10-15 PROCEDURE — 10020000 ZZH LODGING PLUS FACILITY CHARGE ADULT

## 2017-10-15 PROCEDURE — H2035 A/D TX PROGRAM, PER HOUR: HCPCS | Mod: HQ

## 2017-10-16 NOTE — PROGRESS NOTES
10/16/17  Pt shared her relapse prevention plan and received a medallion from peers for program completion.  Pt and counselor met to review her plan for ongoing care.  Pt appears prepared for discharge at this time.  Pt discharged, this date, and plans to enter the [facility] [place].

## 2017-10-16 NOTE — PROGRESS NOTES
[address] [place] [address]          Tiffany Vargas, 1985, was admitted for evaluation/treatment of chemical dependency at [facility].  This person took part in these program(s):    ______ The Inpatient Program   ______ The Outpatient Program   __x___ The Lodging Plus Program   ______ Lodging Day Outpatient       Date admitted: 09/18/17  Date discharged: 10/16/17    Type of discharge:   __x___ Satisfactory - completed evaluation / treatment   ______ Discharged without completing   ______ Behavioral discharge   ______ Transferred to another chemical dependency program   ______ Transferred to another type of service   ______ Left against medical advice (AMA) / Eloped       Comments: Referred to [facility] outpatient program - Discharged to [facility]      Counselor: LUPE Dominguez                       Date: 10/16/2017             Time: 7:38 AM

## 2017-10-16 NOTE — PROGRESS NOTES
[facility] Discharge Summary/Instructions     Patient: Tiffany Vargas  MRN: [id number]   : 1985 Age: 32 year old Sex: female   -  Focus of Treatment / Discharge Recommendations    Personal Safety/ Management of Symptoms    * Follow your safety plan.  Report increased symptoms to your care team and /or go to the nearest Emergency Department.  * Call crisis lines as needed  [facility] [phone]                [facility] [phone]  [facility] [phone]              Crisis Connection [phone]  [facility] [phone]              [facility] [phone]  [facility] [phone]          National Suicide Prevention [phone]  [facility] [phone]            Suicide Prevention [phone]  [facility] [phone]    Abstinence/Relapse Prevention  * Take all medicines as directed.  Carry a current list of medicines with you.  * Use coping skills: nutrition, rest, exercise, spirituality, journal, meditation, engage in activities you enjoy, seek sober support  * Do not use illicit (street) drugs, controlled substances (narcotics) or alcohol.    Develop/Improve Independent Living/Socialization Skills: ensure living environment is conducive to recovery     Community Resources/Supports and Discharge Planning:  Maintain abstinence from all mood altering substances, attend 2+ 12 step meetings per week, maintain contact with a program sponsor, enter the [facility] outpatient program and follow recommendations, enter [facility] and follow guidelines of the [place], seek 1.1 therapy, maintain good physical and mental health care  Follow up with psychiatrist / main caregiver: Dr Quiros and BOBBY MANUEL - seek PCP     Follow up with your therapist: referrals provided    Next visit: TBD  Go to group therapy and / or support groups at: outpatient program, sober house and in home community  See your medical doctor about: ongoing physical health  care    Client Signature:_______________________   Date / Time:___________  Staff Signature:________________________   Date / Time:___________

## 2017-10-20 NOTE — ADDENDUM NOTE
Encounter addended by: Skyla Velez on: 10/20/2017  5:11 AM<BR>     Actions taken: Flowsheet accepted
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
## 2017-10-25 ENCOUNTER — OFFICE VISIT (OUTPATIENT)
Dept: ADDICTION MEDICINE | Facility: CLINIC | Age: 32
End: 2017-10-25
Payer: MEDICAID

## 2017-10-25 VITALS
OXYGEN SATURATION: 100 % | TEMPERATURE: 98.5 F | RESPIRATION RATE: 18 BRPM | HEART RATE: 93 BPM | WEIGHT: 118 LBS | DIASTOLIC BLOOD PRESSURE: 60 MMHG | BODY MASS INDEX: 20.25 KG/M2 | SYSTOLIC BLOOD PRESSURE: 102 MMHG

## 2017-10-25 DIAGNOSIS — F17.200 NICOTINE USE DISORDER: ICD-10-CM

## 2017-10-25 DIAGNOSIS — R79.89 ELEVATED LFTS: ICD-10-CM

## 2017-10-25 DIAGNOSIS — F10.20 ALCOHOL USE DISORDER, SEVERE, DEPENDENCE (H): Primary | ICD-10-CM

## 2017-10-25 LAB
AMPHETAMINES UR QL: NOT DETECTED NG/ML
BARBITURATES UR QL SCN: NOT DETECTED NG/ML
BENZODIAZ UR QL SCN: NOT DETECTED NG/ML
BUPRENORPHINE UR QL: NOT DETECTED NG/ML
CANNABINOIDS UR QL: NOT DETECTED NG/ML
COCAINE UR QL SCN: NOT DETECTED NG/ML
D-METHAMPHET UR QL: NOT DETECTED NG/ML
METHADONE UR QL SCN: NOT DETECTED NG/ML
OPIATES UR QL SCN: NOT DETECTED NG/ML
OXYCODONE UR QL SCN: NOT DETECTED NG/ML
PCP UR QL SCN: NOT DETECTED NG/ML
PROPOXYPH UR QL: NOT DETECTED NG/ML
TRICYCLICS UR QL SCN: NOT DETECTED NG/ML

## 2017-10-25 PROCEDURE — 80306 DRUG TEST PRSMV INSTRMNT: CPT | Performed by: PEDIATRICS

## 2017-10-25 PROCEDURE — 36415 COLL VENOUS BLD VENIPUNCTURE: CPT | Performed by: PEDIATRICS

## 2017-10-25 PROCEDURE — 99205 OFFICE O/P NEW HI 60 MIN: CPT | Performed by: PEDIATRICS

## 2017-10-25 PROCEDURE — 80076 HEPATIC FUNCTION PANEL: CPT | Performed by: PEDIATRICS

## 2017-10-25 PROCEDURE — 82977 ASSAY OF GGT: CPT | Performed by: PEDIATRICS

## 2017-10-25 RX ORDER — NALTREXONE HYDROCHLORIDE 50 MG/1
50 TABLET, FILM COATED ORAL DAILY
Qty: 30 TABLET | Refills: 3 | Status: SHIPPED | OUTPATIENT
Start: 2017-10-25 | End: 2018-10-20

## 2017-10-25 NOTE — MR AVS SNAPSHOT
After Visit Summary   10/25/2017    Tiffany Vargas    MRN: [id number]           Patient Information     Date Of Birth          1985        Visit Information        Provider Department      10/25/2017 3:20 PM Gosia Quiros MD [facility]        Today's Diagnoses     Alcohol use disorder, severe, dependence (H)    -  1    Elevated LFTs        Nicotine use disorder           Follow-ups after your visit        Your next 10 appointments already scheduled     Nov 22, 2017  3:20 PM CST   Return Visit with Gosia Quiros MD   [facility] ([facility])    [address]  [address]  [place] [address]   [phone]              Future tests that were ordered for you today     Open Standing Orders        Priority Remaining Interval Expires Ordered    Urine Drugs of Abuse Screen Panel 13 Routine 11/12  10/24/2018 10/24/2017            Who to contact     If you have questions or need follow up information about today's clinic visit or your schedule please contact [facility] directly at [phone].  Normal or non-critical lab and imaging results will be communicated to you by Humhart, letter or phone within 4 business days after the clinic has received the results. If you do not hear from us within 7 days, please contact the clinic through Quibbt or phone. If you have a critical or abnormal lab result, we will notify you by phone as soon as possible.  Submit refill requests through [facility] or call your pharmacy and they will forward the refill request to us. Please allow 3 business days for your refill to be completed.          Additional Information About Your Visit        MyChart Information     [facility] lets you send messages to your doctor, view your test results, renew your prescriptions, schedule appointments and more. To sign up, go to [email or web address] .  "Click on \"Log in\" on the left side of the screen, which will take you to the Welcome page. Then click on \"Sign up Now\" on the right side of the page.     You will be asked to enter the access code listed below, as well as some personal information. Please follow the directions to create your username and password.     Your access code is: [email or web address]  Expires: 2017 11:06 AM     Your access code will  in 90 days. If you need help or a new code, please call your [place] clinic or [phone].        Care EveryWhere ID     This is your Care EveryWhere ID. This could be used by other organizations to access your [place] medical records  [id number]        Your Vitals Were     Pulse Temperature Respirations Pulse Oximetry BMI (Body Mass Index)       93 98.5  F (36.9  C) (Oral) 18 100% 20.25 kg/m2        Blood Pressure from Last 3 Encounters:   10/25/17 102/60   17 102/68   17 116/68    Weight from Last 3 Encounters:   10/25/17 118 lb (53.5 kg)   17 118 lb 8 oz (53.8 kg)   17 118 lb 9.6 oz (53.8 kg)              We Performed the Following     GGT     Hepatic panel (Albumin, ALT, AST, Bili, Alk Phos, TP)     Urine Drugs of Abuse Screen Panel 13          Today's Medication Changes          These changes are accurate as of: 10/25/17  7:25 PM.  If you have any questions, ask your nurse or doctor.               Start taking these medicines.        Dose/Directions    naltrexone 50 MG tablet   Commonly known as:  DEPADE;REVIA   Used for:  Alcohol use disorder, severe, dependence (H)   Started by:  Gosia Quiros MD        Dose:  50 mg   Take 1 tablet (50 mg) by mouth daily   Quantity:  30 tablet   Refills:  3            Where to get your medicines      These medications were sent to [facility] [address]  [address] [place] [address], [facility] [address]     Phone:  [phone]     naltrexone 50 MG tablet                Primary Care Provider    " Physician No Ref-Primary       NO REF-PRIMARY PHYSICIAN        Equal Access to Services     SONIA SIMONS : Hadii aad ku hadmadihatodd Rosales, watad burgos, qamelaniehoma khan, genevieve villalobos. So [facility] [phone].    ATENCIÓN: Si habla español, tiene a shankar disposición servicios gratuitos de asistencia lingüística. Llame al [phone].    We comply with applicable federal civil rights laws and [place] laws. We do not discriminate on the basis of race, color, national origin, age, disability, sex, sexual orientation, or gender identity.            Thank you!     Thank you for choosing [facility]  for your care. Our goal is always to provide you with excellent care. Hearing back from our patients is one way we can continue to improve our services. Please take a few minutes to complete the written survey that you may receive in the mail after your visit with us. Thank you!             Your Updated Medication List - Protect others around you: Learn how to safely use, store and throw away your medicines at www.disposemymeds.org.          This list is accurate as of: 10/25/17  7:25 PM.  Always use your most recent med list.                   Brand Name Dispense Instructions for use Diagnosis    gabapentin 300 MG capsule    NEURONTIN    90 capsule    Take 1 capsule (300 mg) by mouth 3 times daily    Alcohol dependence with intoxication with complication (H)       * HYDROXYZINE HCL PO      Take 25 mg by mouth 3 times daily        * HYDROXYZINE HCL PO      Take 25 mg by mouth 4 times daily as needed for itching For anxiety        lactulose 10 GM/15ML solution    CHRONULAC     Take 20 g by mouth 3 times daily Take 15 ml by mouth once daily before lunch and take 30 mls by mouth two times a day as needed (To produce 3-4 loose stools per day)        METHYLPREDNISOLONE PO      Take 8 mg by mouth Take one-half  tab (4 mg) by mouth for 10/15 thru 10/17. Then STOP         METOPROLOL TARTRATE PO      Take 25 mg by mouth 2 times daily        naltrexone 50 MG tablet    DEPADE;REVIA    30 tablet    Take 1 tablet (50 mg) by mouth daily    Alcohol use disorder, severe, dependence (H)       omeprazole 40 MG capsule    priLOSEC     Take 40 mg by mouth every morning (before breakfast)        traZODone 50 MG tablet    DESYREL    90 tablet    Take 1 tablet (50 mg) by mouth nightly as needed for sleep    Primary insomnia       URSODIOL PO      Take 300 mg by mouth 3 times daily (with meals)        * Notice:  This list has 2 medication(s) that are the same as other medications prescribed for you. Read the directions carefully, and ask your doctor or other care provider to review them with you.

## 2017-10-25 NOTE — PROGRESS NOTES
SUBJECTIVE:                                                        Tiffany Vargas is a 32 year old female who presents for  initial visit for addiction consultation and management referred by [facility].      HPI:   Was in [facility] for one month and hospitalized at [facility] for 3 1/2 wk for sepsis, liver inflammation, jaundice and detox.   Currently sober almost 2 month. Living in a sober house and outpatient through sober house ([facility]).   No legal issues.   LFT decreased at last check.  MESFIN today for previous providers.   Follow up with GI 2 mo.   Jaundice much improved.     CD History:     DRUG OF CHOICE - Etoh       LAST USE: 2 mo ago  Had been drinking for 1 1/2 yr very heavily (1/5th or more/day).  but problematic from age 24 in binge pattern.  Daily for about four years.     LONGEST PERIOD OF SOBRIETY-limited    PREVIOUS DETOX/TREATMENT PROGRAMS-[facility] 2013.  Sober few months.  2015 [facility] in [place] -6 mo sober.  [facility] again but drinking after 2 wk.       HISTORY OF OVERDOSE-none    PREVIOUS MEDICATION ASSISTED TREATMENT:  none      Other Substances:    ALCOHOL-none   MARIJUANA-used in past  COCAINE/CRACK-none  METH/AMPHETAMINES-None  OPIATES-none  BENZODIAZEPINES-Rx several years ago.  Did like them (didn't disclose drinking ) but took as directed.      NICOTINE-smoke 1/2 PPD.     Desire to quit would like to         Previous attempts to quit   Quit for four months.               PAST PSYCHIATRIC HISTORY   Hx of depression /anxiety.  No current medications.  No hx of SIB, SA.  No hx of eating disorder.     Patient Active Problem List    Diagnosis Date Noted     Chemical dependency (H) 09/21/2017     Priority: Medium       Problem list and histories reviewed & adjusted, as indicated.  Additional history: as documented     No past medical history on file.    No past surgical history on file.      Family History   Problem Relation Age of Onset     Depression Mother       Anxiety Disorder Mother      Depression Maternal Grandmother      Bipolar Disorder Maternal Grandmother      Depression Sister          Social History     Social History Narrative    Currently living in sober living.  No children.  No legal concerns.          Current Outpatient Prescriptions   Medication Sig Dispense Refill     HYDROXYZINE HCL PO Take 25 mg by mouth 4 times daily as needed for itching For anxiety       METHYLPREDNISOLONE PO Take 8 mg by mouth Take one-half  tab (4 mg) by mouth for 10/15 thru 10/17. Then STOP       lactulose (CHRONULAC) 10 GM/15ML solution Take 20 g by mouth 3 times daily Take 15 ml by mouth once daily before lunch and take 30 mls by mouth two times a day as needed (To produce 3-4 loose stools per day)       gabapentin (NEURONTIN) 300 MG capsule Take 1 capsule (300 mg) by mouth 3 times daily 90 capsule 0     traZODone (DESYREL) 50 MG tablet Take 1 tablet (50 mg) by mouth nightly as needed for sleep 90 tablet 1     omeprazole (PRILOSEC) 40 MG capsule Take 40 mg by mouth every morning (before breakfast)       URSODIOL PO Take 300 mg by mouth 3 times daily (with meals)       METOPROLOL TARTRATE PO Take 25 mg by mouth 2 times daily       HYDROXYZINE HCL PO Take 25 mg by mouth 3 times daily            Allergies   Allergen Reactions     Sulfa Drugs Anaphylaxis     Childhood reaction         [place] Board of Pharmacy Data Base Reviewed:    YES;       REVIEW OF SYSTEMS:    General: No acute withdrawal symptoms.  No recent infections or fever  Eyes: Negative for vision changes or eye problems  ENT: No problems with ears, nose or throat.  No difficulty swallowing.  Resp: No coughing, wheezing or shortness of breath  CV: No chest pains or palpitations  GI: No nausea, vomiting, abdominal pain, diarrhea, constipation.  Last LFT Oct 4. Were improving per [facility].    Patient has copy at sober house.    : No urinary frequency or dysuria,     Musculoskeletal: No significant muscle  or joint pains, No edema  Neurologic: No numbness, tingling, weakness, problems with balance or coordination  Psychiatric: denies current concerns.   Skin: No rashes        OBJECTIVE:                                                      EXAM    Blood pressure 102/60, pulse 93, temperature 98.5  F (36.9  C), temperature source Oral, resp. rate 18, weight 118 lb (53.5 kg), SpO2 100 %.    GENERAL APPEARANCE: healthy, alert and no distress  EYES: Eyes grossly normal to inspection, PERRL and no nystagmus   HENT: ear canals and TM's normal, nose and mouth without ulcers or lesions and normal cephalic/atraumatic  NECK: no adenopathy, thyromegaly or masses  RESP: lungs clear to auscultation - no rales, rhonchi or wheezes and no resp distress  CV: regular rates and rhythm, normal S1 S2, no S3 or S4 and no murmur, click or rub  ABDOMEN: soft, nontender, without hepatosplenomegaly or masses  MS: extremities normal- no gross deformities noted, gait normal, peripheral pulses normal and no edema  SKIN: no rashes, no jaundice, no obvious masses.   NEURO: Normal strength and tone, sensory exam grossly normal, no tremor  MENTAL STATUS EXAM:  Appearance/Behavior: No apparent distress  Speech: Normal  Mood/Affect: normal affect  Insight: Adequate    Results for orders placed or performed in visit on 10/25/17   Urine Drugs of Abuse Screen Panel 13   Result Value Ref Range    Cannabinoids (24-eqc-4-carboxy-9-THC) Not Detected NDET^Not Detected ng/mL    Phencyclidine (Phencyclidine) Not Detected NDET^Not Detected ng/mL    Cocaine (Benzoylecgonine) Not Detected NDET^Not Detected ng/mL    Methamphetamine (d-Methamphetamine) Not Detected NDET^Not Detected ng/mL    Opiates (Morphine) Not Detected NDET^Not Detected ng/mL    Amphetamine (d-Amphetamine) Not Detected NDET^Not Detected ng/mL    Benzodiazepines (Nordiazepam) Not Detected NDET^Not Detected ng/mL    Tricyclic Antidepressants (Desipramine) Not Detected NDET^Not Detected ng/mL     Methadone (Methadone) Not Detected NDET^Not Detected ng/mL    Barbiturates (Butalbital) Not Detected NDET^Not Detected ng/mL    Oxycodone (Oxycodone) Not Detected NDET^Not Detected ng/mL    Propoxyphene (Norpropoxyphene) Not Detected NDET^Not Detected ng/mL    Buprenorphine (Buprenorphine) Not Detected NDET^Not Detected ng/mL                        ASSESSMENT/PLAN:  (F10.20) Alcohol use disorder, severe, dependence (H)  (primary encounter diagnosis)  Plan: naltrexone (DEPADE;REVIA) 50 MG tablet        Medications for alcohol dependence reviewed.  Disulfiram, Acamprosate and Naltrexone/Vivitrol all reviewed.  Risk, benefit, side effects and intended purposes of each reviewed.   Effect of Naltrexont/Vivitrol with opioid use reviewed.   Patient is interested in Naltrexone.   Rx was given but not to be started until LFT reviewed.   Encourage meeting attendance and sponsorship  Continue treatment as planned.   Follow up one month or sooner if needed as dictated by labs.    MESFIN for previous medical provider.      (R70.52) Elevated LFTs  Comment: Will need to review old labs.  Patient will have on hand for follow up phone call.  Has appt planned with Hepatology.   Plan: Hepatic panel (Albumin, ALT, AST, Bili, Alk         Phos, TP), GGT        May need to adjust medication plan depending on values.  Acetaminophen and alcohol use encouraged to be avoided.     Nicotine use disorder  Encouraged Abstinence.  Increase risk of relapse with other substances with return to nicotine use discussed.  Risk of Ecig/Vaping also reviewed.    Chantix option also briefly discussed.        ENCOUNTER FOR LONG TERM USE OF HIGH RISK MEDICATION   High Risk Drug Monitoring?  YES   Drug being monitored:Naltrexone   Reason for drug: Alcohol Use Disorder   What is being monitored?: Dosage, Cravings, Triggers and side effects         Gosia Quiros MD  [facility]

## 2017-10-26 LAB
ALBUMIN SERPL-MCNC: 3.7 G/DL (ref 3.4–5)
ALP SERPL-CCNC: 92 U/L (ref 40–150)
ALT SERPL W P-5'-P-CCNC: 30 U/L (ref 0–50)
AST SERPL W P-5'-P-CCNC: 28 U/L (ref 0–45)
BILIRUB DIRECT SERPL-MCNC: 0.8 MG/DL (ref 0–0.2)
BILIRUB SERPL-MCNC: 1.2 MG/DL (ref 0.2–1.3)
GGT SERPL-CCNC: 54 U/L (ref 0–40)
PROT SERPL-MCNC: 7.5 G/DL (ref 6.8–8.8)

## 2017-11-02 ENCOUNTER — TELEPHONE (OUTPATIENT)
Dept: ADDICTION MEDICINE | Facility: CLINIC | Age: 32
End: 2017-11-02

## 2017-11-02 NOTE — TELEPHONE ENCOUNTER
Pt called back writer review message below, appt was reschedule to 11/20.      Hua Waldrop

## 2017-11-02 NOTE — TELEPHONE ENCOUNTER
Called and left message with patient regarding recent labs which were normal.  Ok to start Naltrexone if desired. (Rx given at appt).    Also left message that I would request reschedule of her appt 11/22 as I need to be out of office.   Asked her to return call to office.

## 2017-12-26 ENCOUNTER — TELEPHONE (OUTPATIENT)
Dept: BEHAVIORAL HEALTH | Facility: CLINIC | Age: 32
End: 2017-12-26

## 2017-12-26 NOTE — TELEPHONE ENCOUNTER
S:  12/26/17 Client called requesting a Chemical Dependency   Evaluation for OP [place].   B:  Client stated her drug choice is alcohol, but she recently   completed IP last week at [facility] and she is seeking   Step-down.   A:  CD Eval  Hx of Alcohol related seizures in 2014.   Hx of Depression/Anxiety, prescribed Zoloft and Trazodone.   R:  Scheduled for 1/2/17 @ [place].

## 2018-01-02 ENCOUNTER — HOSPITAL ENCOUNTER (OUTPATIENT)
Dept: BEHAVIORAL HEALTH | Facility: CLINIC | Age: 33
Discharge: HOME | End: 2018-01-02
Attending: SOCIAL WORKER | Admitting: SOCIAL WORKER
Payer: COMMERCIAL

## 2018-01-02 PROCEDURE — H0001 ALCOHOL AND/OR DRUG ASSESS: HCPCS

## 2018-01-02 ASSESSMENT — ANXIETY QUESTIONNAIRES
5. BEING SO RESTLESS THAT IT IS HARD TO SIT STILL: MORE THAN HALF THE DAYS
3. WORRYING TOO MUCH ABOUT DIFFERENT THINGS: MORE THAN HALF THE DAYS
4. TROUBLE RELAXING: SEVERAL DAYS
7. FEELING AFRAID AS IF SOMETHING AWFUL MIGHT HAPPEN: SEVERAL DAYS
2. NOT BEING ABLE TO STOP OR CONTROL WORRYING: MORE THAN HALF THE DAYS
GAD7 TOTAL SCORE: 11
1. FEELING NERVOUS, ANXIOUS, OR ON EDGE: SEVERAL DAYS
6. BECOMING EASILY ANNOYED OR IRRITABLE: MORE THAN HALF THE DAYS

## 2018-01-02 ASSESSMENT — PAIN SCALES - GENERAL: PAINLEVEL: NO PAIN (0)

## 2018-01-02 ASSESSMENT — PATIENT HEALTH QUESTIONNAIRE - PHQ9: SUM OF ALL RESPONSES TO PHQ QUESTIONS 1-9: 6

## 2018-01-02 NOTE — PROGRESS NOTES
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 Disorder  Instructions: Determine whether the client currently meets the criteria for Substance Use Disorder using the diagnostic criteria in the DSM-V pp.481-589. Current means during the most recent 12 months outside a facility that controls access to substances    Category of Substance Severity (ICD-10 Code / DSM 5 Code)     Alcohol Use Disorder Severe  (10.20) (303.90) in early remission.   Cannabis Use Disorder NA   Hallucinogen Use Disorder NA   Inhalant Use Disorder NA   Opioid Use Disorder NA   Sedative, Hypnotic, or Anxiolytic Use Disorder NA   Stimulant Related Disorder NA   Tobacco Use Disorder Moderate   (F17.200) (305.1)   Other (or unknown) Substance Use Disorder NA       Collateral Contact Summary   Number of contacts made: 0    Contact with referring person:  NA.    If court related records were reviewed, summarize here: NA          Rule 25 Assessment Summary and Plan   's Recommendation    Client is recommended to attend the day outpatient treatment program at [facility].      Collateral Contacts     Name:    n/a   Relationship:       Phone Number:     Releases:                Collateral Contacts     Name:    n/a   Relationship:       Phone Number:       Releases:             richar Contacts      A problematic pattern of alcohol/drug use leading to clinically significant impairment or distress, as manifested by at least two of the following, occurring within a 12-month period:    Alcohol/drug is often taken in larger amounts or over a longer period than was intended.  There is a persistent desire or unsuccessful efforts to cut down or control alcohol/drug use  A great deal of time is spent in activities necessary to obtain alcohol, use alcohol, or recover from its effects.  Craving, or a strong desire or urge to use alcohol/drug  Recurrent alcohol/drug use resulting in a failure to fulfill major role obligations at work, school or home.  Continued alcohol use despite having persistent or recurrent social or interpersonal problems caused or exacerbated by the effects of alcohol/drug.  Important social, occupational, or recreational activities are given up or reduced because of alcohol/drug use.  Alcohol/drug use is continued despite knowledge of having a persistent or recurrent physical or psychological problem that is likely to have been caused or exacerbated by alcohol.  Tolerance, as defined by either of the following: A need for markedly increased amounts of alcohol/drug to achieve intoxication or desired effect. and A markedly diminished effect with continued use of the same amount of alcohol/drug.  Withdrawal, as manifested by either of the following: The characteristic withdrawal syndrome for alcohol/drug (refer to Criteria A and B of the criteria set for alcohol/drug withdrawal). and Alcohol/drug (or a closely related substance, such as a benzodiazepine) is taken to relieve or avoid withdrawal symptoms.      Specify if: In early remission:  After full criteria for alcohol/drug use disorder were previously met, none of the criteria for alcohol/drug use disorder have  been met for at least 3 months but for less than 12 months (with the exception that Criterion A4,  Craving or a strong desire or urge to use alcohol/drug  may be met).     In sustained remission:   After full criteria for alcohol use disorder were previously met, non of the criteria for alcohol/drug use disorder have been met at any time during a period of 12 months or longer (with the exception that Criterion A4,  Craving or strong desire or urge to use alcohol/drug  may be met).   Specify if:   This additional specifier is used if the individual is in an environment where access to alcohol is restricted.    Mild: Presence of 2-3 symptoms    Moderate: Presence of 4-5 symptoms    Severe: Presence of 6 or more symptoms

## 2018-01-03 ASSESSMENT — ANXIETY QUESTIONNAIRES: GAD7 TOTAL SCORE: 11
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
## 2018-04-12 ENCOUNTER — OFFICE VISIT (OUTPATIENT)
Dept: URGENT CARE | Facility: URGENT CARE | Age: 33
End: 2018-04-12
Payer: COMMERCIAL

## 2018-04-12 VITALS
TEMPERATURE: 98.3 F | DIASTOLIC BLOOD PRESSURE: 68 MMHG | OXYGEN SATURATION: 100 % | RESPIRATION RATE: 18 BRPM | BODY MASS INDEX: 18.02 KG/M2 | HEART RATE: 80 BPM | SYSTOLIC BLOOD PRESSURE: 108 MMHG | WEIGHT: 105 LBS

## 2018-04-12 DIAGNOSIS — R05.8 PRODUCTIVE COUGH: ICD-10-CM

## 2018-04-12 DIAGNOSIS — J34.89 PURULENT NASAL DISCHARGE: Primary | ICD-10-CM

## 2018-04-12 PROCEDURE — 99203 OFFICE O/P NEW LOW 30 MIN: CPT | Performed by: FAMILY MEDICINE

## 2018-04-12 RX ORDER — CEFDINIR 300 MG/1
300 CAPSULE ORAL 2 TIMES DAILY
Qty: 20 CAPSULE | Refills: 0 | Status: SHIPPED | OUTPATIENT
Start: 2018-04-12 | End: 2018-04-22

## 2018-04-12 RX ORDER — MULTIVITAMIN,THERAPEUTIC
1 TABLET ORAL DAILY
Status: ON HOLD | COMMUNITY
End: 2022-01-23

## 2018-04-12 NOTE — MR AVS SNAPSHOT
"              After Visit Summary   2018    Tiffany Vargas    MRN: [id number]           Patient Information     Date Of Birth          1985        Visit Information        Provider Department      2018 9:40 AM Brian Mireles, DO [facility]        Today's Diagnoses     Purulent nasal discharge    -  1    Productive cough           Follow-ups after your visit        Who to contact     If you have questions or need follow up information about today's clinic visit or your schedule please contact [facility] directly at [phone].  Normal or non-critical lab and imaging results will be communicated to you by Avito.ruhart, letter or phone within 4 business days after the clinic has received the results. If you do not hear from us within 7 days, please contact the clinic through Avito.[email or web address] or phone. If you have a critical or abnormal lab result, we will notify you by phone as soon as possible.  Submit refill requests through [employer] or call your pharmacy and they will forward the refill request to us. Please allow 3 business days for your refill to be completed.          Additional Information About Your Visit        MyChart Information     [employer] lets you send messages to your doctor, view your test results, renew your prescriptions, schedule appointments and more. To sign up, go to [email or web address] . Click on \"Log in\" on the left side of the screen, which will take you to the Welcome page. Then click on \"Sign up Now\" on the right side of the page.     You will be asked to enter the access code listed below, as well as some personal information. Please follow the directions to create your username and password.     Your access code is: [email or web address]  Expires: 2018 10:06 AM     Your access code will  in 90 days. If you need help or a new code, please call your [place] clinic or [phone].        Care EveryWhere ID     This is your " Care EveryWhere ID. This could be used by other organizations to access your [place] medical records  [id number]        Your Vitals Were     Pulse Temperature Respirations Pulse Oximetry BMI (Body Mass Index)       80 98.3  F (36.8  C) (Oral) 18 100% 18.02 kg/m2        Blood Pressure from Last 3 Encounters:   04/12/18 108/68   10/25/17 102/60   09/21/17 102/68    Weight from Last 3 Encounters:   04/12/18 105 lb (47.6 kg)   10/25/17 118 lb (53.5 kg)   09/21/17 118 lb 8 oz (53.8 kg)              Today, you had the following     No orders found for display         Today's Medication Changes          These changes are accurate as of 4/12/18 10:06 AM.  If you have any questions, ask your nurse or doctor.               Start taking these medicines.        Dose/Directions    cefdinir 300 MG capsule   Commonly known as:  OMNICEF   Used for:  Purulent nasal discharge, Productive cough   Started by:  Brian Mireles DO        Dose:  300 mg   Take 1 capsule (300 mg) by mouth 2 times daily for 10 days   Quantity:  20 capsule   Refills:  0            Where to get your medicines      These medications were sent to [id number] [address] [facility]  [address] [facility] [address]     Phone:  [phone]     cefdinir 300 MG capsule                Primary Care Provider Fax #    Physician No Ref-Primary [phone]       No address on file        Equal Access to Services     SONIA SIMONS AH: Oscar Rosales, waaxda lupatadaha, qaybta kaalmada erin, genevieve villalobos. So [facility] [phone].    ATENCIÓN: Si habla español, tiene a shankar disposición servicios gratuitos de asistencia lingüística. Llame al [phone].    We comply with applicable federal civil rights laws and [place] laws. We do not discriminate on the basis of race, color, national origin, age, disability, sex, sexual orientation, or gender identity.            Thank you!     Thank you  for choosing [facility]  for your care. Our goal is always to provide you with excellent care. Hearing back from our patients is one way we can continue to improve our services. Please take a few minutes to complete the written survey that you may receive in the mail after your visit with us. Thank you!             Your Updated Medication List - Protect others around you: Learn how to safely use, store and throw away your medicines at www.disposemymeds.org.          This list is accurate as of 4/12/18 10:06 AM.  Always use your most recent med list.                   Brand Name Dispense Instructions for use Diagnosis    cefdinir 300 MG capsule    OMNICEF    20 capsule    Take 1 capsule (300 mg) by mouth 2 times daily for 10 days    Purulent nasal discharge, Productive cough       gabapentin 300 MG capsule    NEURONTIN    90 capsule    Take 1 capsule (300 mg) by mouth 3 times daily    Alcohol dependence with intoxication with complication (H)       * HYDROXYZINE HCL PO      Take 25 mg by mouth 3 times daily        * HYDROXYZINE HCL PO      Take 25 mg by mouth 4 times daily as needed for itching For anxiety        lactulose 10 GM/15ML solution    CHRONULAC     Take 20 g by mouth 3 times daily Take 15 ml by mouth once daily before lunch and take 30 mls by mouth two times a day as needed (To produce 3-4 loose stools per day)        METHYLPREDNISOLONE PO      Take 8 mg by mouth Take one-half  tab (4 mg) by mouth for 10/15 thru 10/17. Then STOP        METOPROLOL TARTRATE PO      Take 25 mg by mouth 2 times daily        multivitamin, therapeutic Tabs tablet      Take 1 tablet by mouth daily        naltrexone 50 MG tablet    DEPADE;REVIA    30 tablet    Take 1 tablet (50 mg) by mouth daily    Alcohol use disorder, severe, dependence (H)       omeprazole 40 MG capsule    priLOSEC     Take 40 mg by mouth every morning (before breakfast)        traZODone 50 MG tablet    DESYREL    90 tablet    Take  1 tablet (50 mg) by mouth nightly as needed for sleep    Primary insomnia       URSODIOL PO      Take 300 mg by mouth 3 times daily (with meals)        VISTARIL PO           ZOLOFT PO      Take by mouth daily        * Notice:  This list has 2 medication(s) that are the same as other medications prescribed for you. Read the directions carefully, and ask your doctor or other care provider to review them with you.

## 2018-04-12 NOTE — LETTER
[facility]  [address] [address]  [phone]      April 12, 2018    RE:  Tiffany Vargas                                                                                                                                                       [address]  [address]  [facility] [address]            To whom it may concern:    Tiffany Vargas is under my professional care for Medical. She  may return to work with the following: No working or lifting restrictions on or about 4-16-18.          Sincerely,        Brian Mireles    [facility]

## 2018-04-12 NOTE — PROGRESS NOTES
SUBJECTIVE: Tiffany Vargas is a 33 year old female presenting with a chief complaint of nasal congestion and cough .  Onset of symptoms was 1 week(s) ago.  Course of illness is worsening.    Severity moderate  Current and Associated symptoms: runny nose, stuffy nose, cough - productive and facial pain/pressure  Treatment measures tried include Tylenol/Ibuprofen.  Predisposing factors include None.    Past Medical History:   Diagnosis Date     Anxiety      Depressive disorder        No past surgical history on file.    Family History   Problem Relation Age of Onset     Depression Mother      Anxiety Disorder Mother      Depression Father      Depression Maternal Grandmother      Bipolar Disorder Maternal Grandmother      Unknown/Adopted Maternal Grandfather      Unknown/Adopted Paternal Grandmother      Substance Abuse Paternal Grandfather      Dementia Paternal Grandfather      Depression Brother      Substance Abuse Brother      Depression Sister      Schizophrenia No family hx of      Suicide No family hx of      Jacek Disease No family hx of      Parkinsonism No family hx of      Autism Spectrum Disorder No family hx of      Intellectual Disability (Mental Retardation) No family hx of      MENTAL ILLNESS No family hx of        Social History   Substance Use Topics     Smoking status: Current Every Day Smoker     Packs/day: 0.50     Types: Cigarettes     Smokeless tobacco: Never Used     Alcohol use Not on file     ROS:  SKIN: no rash  GI: no vomiting    OBJECTIVE:  /68  Pulse 80  Temp 98.3  F (36.8  C) (Oral)  Resp 18  Wt 105 lb (47.6 kg)  SpO2 100%  BMI 18.02 kg/m2   GENERAL APPEARANCE: healthy, alert and no distress  EYES: EOMI,  PERRL, conjunctiva clear  HENT: TM's normal bilaterally, rhinorrhea yellow, oral mucous membranes moist, no erythema noted and maxillary sinus tenderness   NECK: supple, nontender, no lymphadenopathy  RESP: lungs clear to auscultation - no rales, rhonchi or  wheezes  SKIN: no suspicious lesions or rashes      ICD-10-CM    1. Purulent nasal discharge J34.89 cefdinir (OMNICEF) 300 MG capsule   2. Productive cough R05 cefdinir (OMNICEF) 300 MG capsule     Fluids/Rest, f/u if worse/not any better

## 2018-06-09 ENCOUNTER — OFFICE VISIT (OUTPATIENT)
Dept: URGENT CARE | Facility: URGENT CARE | Age: 33
End: 2018-06-09
Payer: COMMERCIAL

## 2018-06-09 VITALS
WEIGHT: 101.19 LBS | OXYGEN SATURATION: 100 % | SYSTOLIC BLOOD PRESSURE: 98 MMHG | TEMPERATURE: 98.4 F | DIASTOLIC BLOOD PRESSURE: 65 MMHG | HEART RATE: 72 BPM | BODY MASS INDEX: 17.37 KG/M2

## 2018-06-09 DIAGNOSIS — J01.90 ACUTE SINUSITIS WITH SYMPTOMS > 10 DAYS: Primary | ICD-10-CM

## 2018-06-09 PROCEDURE — 99213 OFFICE O/P EST LOW 20 MIN: CPT | Performed by: FAMILY MEDICINE

## 2018-06-09 RX ORDER — AMOXICILLIN 500 MG/1
500 CAPSULE ORAL 3 TIMES DAILY
Qty: 30 CAPSULE | Refills: 0 | Status: SHIPPED | OUTPATIENT
Start: 2018-06-09 | End: 2018-10-20

## 2018-06-09 NOTE — MR AVS SNAPSHOT
"              After Visit Summary   2018    Tiffany Vargas    MRN: [id number]           Patient Information     Date Of Birth          1985        Visit Information        Provider Department      2018 9:35 AM Bony Edward MD [facility]        Today's Diagnoses     Acute sinusitis with symptoms > 10 days    -  1       Follow-ups after your visit        Who to contact     If you have questions or need follow up information about today's clinic visit or your schedule please contact [facility] directly at [phone].  Normal or non-critical lab and imaging results will be communicated to you by MyChart, letter or phone within 4 business days after the clinic has received the results. If you do not hear from us within 7 days, please contact the clinic through Bliipshart or phone. If you have a critical or abnormal lab result, we will notify you by phone as soon as possible.  Submit refill requests through [employer] or call your pharmacy and they will forward the refill request to us. Please allow 3 business days for your refill to be completed.          Additional Information About Your Visit        MyChart Information     [employer] lets you send messages to your doctor, view your test results, renew your prescriptions, schedule appointments and more. To sign up, go to [email or web address] . Click on \"Log in\" on the left side of the screen, which will take you to the Welcome page. Then click on \"Sign up Now\" on the right side of the page.     You will be asked to enter the access code listed below, as well as some personal information. Please follow the directions to create your username and password.     Your access code is: [email or web address]  Expires: 2018 10:06 AM     Your access code will  in 90 days. If you need help or a new code, please call your [place] clinic or [phone].        Care EveryWhere ID     This is your Care " EveryWhere ID. This could be used by other organizations to access your [place] medical records  [id number]        Your Vitals Were     Pulse Temperature Pulse Oximetry BMI (Body Mass Index)          72 98.4  F (36.9  C) (Oral) 100% 17.37 kg/m2         Blood Pressure from Last 3 Encounters:   06/09/18 98/65   04/12/18 108/68   10/25/17 102/60    Weight from Last 3 Encounters:   06/09/18 101 lb 3 oz (45.9 kg)   04/12/18 105 lb (47.6 kg)   10/25/17 118 lb (53.5 kg)              Today, you had the following     No orders found for display         Today's Medication Changes          These changes are accurate as of 6/9/18 10:39 AM.  If you have any questions, ask your nurse or doctor.               Start taking these medicines.        Dose/Directions    amoxicillin 500 MG capsule   Commonly known as:  AMOXIL   Used for:  Acute sinusitis with symptoms > 10 days        Dose:  500 mg   Take 1 capsule (500 mg) by mouth 3 times daily   Quantity:  30 capsule   Refills:  0         These medicines have changed or have updated prescriptions.        Dose/Directions    HYDROXYZINE HCL PO   This may have changed:  Another medication with the same name was removed. Continue taking this medication, and follow the directions you see here.        Dose:  25 mg   Take 25 mg by mouth 4 times daily as needed for itching For anxiety   Refills:  0         Stop taking these medicines if you haven't already. Please contact your care team if you have questions.     lactulose 10 GM/15ML solution   Commonly known as:  CHRONULAC           METHYLPREDNISOLONE PO           METOPROLOL TARTRATE PO           omeprazole 40 MG capsule   Commonly known as:  priLOSEC           URSODIOL PO           VISTARIL PO                Where to get your medicines      These medications were sent to [id number] - [place], [place] - [address]  [address] [facility] [address]     Phone:  [phone]     amoxicillin 500 MG capsule                 Primary Care Provider Fax #    Physician No Ref-Primary [phone]       No address on file        Equal Access to Services     SONAI SIMONS : Hadii nat porter judit Rosales, kgda viramohan, ina khan, genevieve michaelmaryanne stone lashayegriselda villalobos. So [facility] [phone].    ATENCIÓN: Si habla español, tiene a shankar disposición servicios gratuitos de asistencia lingüística. Llame al [phone].    We comply with applicable federal civil rights laws and [place] laws. We do not discriminate on the basis of race, color, national origin, age, disability, sex, sexual orientation, or gender identity.            Thank you!     Thank you for choosing [facility]  for your care. Our goal is always to provide you with excellent care. Hearing back from our patients is one way we can continue to improve our services. Please take a few minutes to complete the written survey that you may receive in the mail after your visit with us. Thank you!             Your Updated Medication List - Protect others around you: Learn how to safely use, store and throw away your medicines at www.disposemymeds.org.          This list is accurate as of 6/9/18 10:39 AM.  Always use your most recent med list.                   Brand Name Dispense Instructions for use Diagnosis    amoxicillin 500 MG capsule    AMOXIL    30 capsule    Take 1 capsule (500 mg) by mouth 3 times daily    Acute sinusitis with symptoms > 10 days       gabapentin 300 MG capsule    NEURONTIN    90 capsule    Take 1 capsule (300 mg) by mouth 3 times daily    Alcohol dependence with intoxication with complication (H)       HYDROXYZINE HCL PO      Take 25 mg by mouth 4 times daily as needed for itching For anxiety        multivitamin, therapeutic Tabs tablet      Take 1 tablet by mouth daily        naltrexone 50 MG tablet    DEPADE;REVIA    30 tablet    Take 1 tablet (50 mg) by mouth daily    Alcohol use disorder, severe,  dependence (H)       traZODone 50 MG tablet    DESYREL    90 tablet    Take 1 tablet (50 mg) by mouth nightly as needed for sleep    Primary insomnia       ZOLOFT PO      Take by mouth daily

## 2018-06-09 NOTE — PROGRESS NOTES
SUBJECTIVE:  Tiffany Vargas is a 33 year old female here with concerns about sinus infection.  She states onset of symptoms were 1 week(s) ago.  She has had maxillary, frontal pressure. Course of illness is worsening. Severity moderate  Current and Associated symptoms: chills and neck pain  Predisposing factors include recent illness. Recent treatment has included: advil    Past Medical History:   Diagnosis Date     Anxiety      Depressive disorder      Social History   Substance Use Topics     Smoking status: Current Every Day Smoker     Packs/day: 0.50     Types: Cigarettes     Smokeless tobacco: Never Used     Alcohol use Not on file     ROS:  INTEGUMENTARY/SKIN: NEGATIVE for worrisome rashes, moles or lesions  EYES: NEGATIVE for vision changes or irritation    OBJECTIVE:  BP 98/65  Pulse 72  Temp 98.4  F (36.9  C) (Oral)  Wt 101 lb 3 oz (45.9 kg)  SpO2 100%  BMI 17.37 kg/m2  Exam:GENERAL APPEARANCE: healthy, alert and no distress  EYES: EOMI,  PERRL, conjunctiva clear  HENT: ear canals and TM's normal.  Nose and mouth without ulcers, erythema or lesions  NECK: supple, nontender, no lymphadenopathy  RESP: lungs clear to auscultation - no rales, rhonchi or wheezes  CV: regular rates and rhythm, normal S1 S2, no murmur noted  NEURO: Normal strength and tone, sensory exam grossly normal,  normal speech and mentation  SKIN: no suspicious lesions or rashes    ASSESSMENT:  1. Acute sinusitis with symptoms > 10 days  Symptomatic cares were discussed in detail.  Pt instructed to come back to the clinic for worsening sx    - amoxicillin (AMOXIL) 500 MG capsule; Take 1 capsule (500 mg) by mouth 3 times daily  Dispense: 30 capsule; Refill: 0

## 2018-07-06 ENCOUNTER — HOSPITAL ENCOUNTER (OUTPATIENT)
Dept: ULTRASOUND IMAGING | Facility: CLINIC | Age: 33
Discharge: HOME | End: 2018-07-06
Attending: NURSE PRACTITIONER | Admitting: NURSE PRACTITIONER
Payer: COMMERCIAL

## 2018-07-06 DIAGNOSIS — F10.21 ALCOHOL USE DISORDER, SEVERE, IN EARLY REMISSION (H): ICD-10-CM

## 2018-07-06 DIAGNOSIS — K74.60 CIRRHOSIS (H): ICD-10-CM

## 2018-07-06 PROCEDURE — 76700 US EXAM ABDOM COMPLETE: CPT

## 2018-10-20 ENCOUNTER — OFFICE VISIT (OUTPATIENT)
Dept: URGENT CARE | Facility: URGENT CARE | Age: 33
End: 2018-10-20
Payer: COMMERCIAL

## 2018-10-20 VITALS
TEMPERATURE: 97.9 F | BODY MASS INDEX: 18.38 KG/M2 | SYSTOLIC BLOOD PRESSURE: 92 MMHG | RESPIRATION RATE: 12 BRPM | HEART RATE: 93 BPM | DIASTOLIC BLOOD PRESSURE: 60 MMHG | OXYGEN SATURATION: 100 % | WEIGHT: 107.1 LBS

## 2018-10-20 DIAGNOSIS — S05.01XA ABRASION OF RIGHT CORNEA, INITIAL ENCOUNTER: Primary | ICD-10-CM

## 2018-10-20 PROCEDURE — 99213 OFFICE O/P EST LOW 20 MIN: CPT | Performed by: FAMILY MEDICINE

## 2018-10-20 RX ORDER — OFLOXACIN 3 MG/ML
1 SOLUTION/ DROPS OPHTHALMIC 4 TIMES DAILY
Qty: 1 ML | Refills: 0 | Status: SHIPPED | OUTPATIENT
Start: 2018-10-20 | End: 2018-10-25

## 2018-10-20 RX ORDER — OFLOXACIN 3 MG/ML
1 SOLUTION/ DROPS OPHTHALMIC 4 TIMES DAILY
Qty: 1 BOTTLE | Refills: 0 | Status: SHIPPED | OUTPATIENT
Start: 2018-10-20 | End: 2018-10-20

## 2018-10-20 NOTE — PROGRESS NOTES
SUBJECTIVE:  Chief Complaint:   Chief Complaint   Patient presents with     Eye Problem     Pt states right eye cat scratch x 1 day      Urgent Care     History of Present Illness:  Tiffany Vargas is a 33 year old female who presents complaining of mild right eye pain, burning for 1 day(s).   Onset/timing: sudden.    Associated Signs and Symptoms: none  Treatment measures tried include: warm packs  Contact wearer : Yes     Vision is normal    Past Medical History:   Diagnosis Date     Anxiety      Depressive disorder      Current Outpatient Prescriptions   Medication Sig Dispense Refill     multivitamin, therapeutic (THERA-VIT) TABS tablet Take 1 tablet by mouth daily       Sertraline HCl (ZOLOFT PO) Take by mouth daily       traZODone (DESYREL) 50 MG tablet Take 1 tablet (50 mg) by mouth nightly as needed for sleep 90 tablet 1     amoxicillin (AMOXIL) 500 MG capsule Take 1 capsule (500 mg) by mouth 3 times daily (Patient not taking: Reported on 10/20/2018) 30 capsule 0     gabapentin (NEURONTIN) 300 MG capsule Take 1 capsule (300 mg) by mouth 3 times daily (Patient not taking: Reported on 4/12/2018) 90 capsule 0     HYDROXYZINE HCL PO Take 25 mg by mouth 4 times daily as needed for itching For anxiety       naltrexone (DEPADE;REVIA) 50 MG tablet Take 1 tablet (50 mg) by mouth daily (Patient not taking: Reported on 4/12/2018) 30 tablet 3        ROS:  CONSTITUTIONAL:NEGATIVE for fever, chills, change in weight  INTEGUMENTARY/SKIN: NEGATIVE for worrisome rashes, moles or lesions    OBJECTIVE:  BP 92/60  Pulse 93  Temp 97.9  F (36.6  C) (Oral)  Resp 12  Wt 107 lb 1.6 oz (48.6 kg)  SpO2 100%  BMI 18.38 kg/m2  General: mild  distress  Eye exam: left eye normal lid, conjunctiva, cornea, pupil and fundus, right eye abnormal findings: corneal abrasion noted 8 oclock   PERRLA, EOMI,  Lid eversion negative   .    ASSESSMENT:  Corneal abrasion    PLAN:  Warm packs for comfort. Ocuflox     See ophthal Monday   To ED with  increased pain, decreased vision  See orders in epic

## 2018-10-20 NOTE — MR AVS SNAPSHOT
"              After Visit Summary   10/20/2018    Tiffany Vargas    MRN: [id number]           Patient Information     Date Of Birth          1985        Visit Information        Provider Department      10/20/2018 9:25 AM Bony Edward MD [facility]        Today's Diagnoses     Abrasion of right cornea, initial encounter    -  1       Follow-ups after your visit        Who to contact     If you have questions or need follow up information about today's clinic visit or your schedule please contact [facility] directly at [phone].  Normal or non-critical lab and imaging results will be communicated to you by MyChart, letter or phone within 4 business days after the clinic has received the results. If you do not hear from us within 7 days, please contact the clinic through [employer] or phone. If you have a critical or abnormal lab result, we will notify you by phone as soon as possible.  Submit refill requests through [employer] or call your pharmacy and they will forward the refill request to us. Please allow 3 business days for your refill to be completed.          Additional Information About Your Visit        MyChart Information     [employer] lets you send messages to your doctor, view your test results, renew your prescriptions, schedule appointments and more. To sign up, go to [email or web address] . Click on \"Log in\" on the left side of the screen, which will take you to the Welcome page. Then click on \"Sign up Now\" on the right side of the page.     You will be asked to enter the access code listed below, as well as some personal information. Please follow the directions to create your username and password.     Your access code is: [email or web address]  Expires: 2019  9:58 AM     Your access code will  in 90 days. If you need help or a new code, please call your [place] clinic or [phone].        Care EveryWhere ID     This is your Care " EveryWhere ID. This could be used by other organizations to access your [place] medical records  [id number]        Your Vitals Were     Pulse Temperature Respirations Pulse Oximetry BMI (Body Mass Index)       93 97.9  F (36.6  C) (Oral) 12 100% 18.38 kg/m2        Blood Pressure from Last 3 Encounters:   10/20/18 92/60   06/09/18 98/65   04/12/18 108/68    Weight from Last 3 Encounters:   10/20/18 107 lb 1.6 oz (48.6 kg)   06/09/18 101 lb 3 oz (45.9 kg)   04/12/18 105 lb (47.6 kg)              Today, you had the following     No orders found for display         Today's Medication Changes          These changes are accurate as of 10/20/18  9:58 AM.  If you have any questions, ask your nurse or doctor.               Start taking these medicines.        Dose/Directions    ofloxacin 0.3 % ophthalmic solution   Commonly known as:  OCUFLOX   Used for:  Abrasion of right cornea, initial encounter   Started by:  Bony Edward MD        Dose:  1 drop   Place 1 drop into the right eye 4 times daily for 5 days   Quantity:  1 mL   Refills:  0         Stop taking these medicines if you haven't already. Please contact your care team if you have questions.     amoxicillin 500 MG capsule   Commonly known as:  AMOXIL   Stopped by:  Bony Edward MD           gabapentin 300 MG capsule   Commonly known as:  NEURONTIN   Stopped by:  Bony Edward MD           HYDROXYZINE HCL PO   Stopped by:  Bony Edward MD           naltrexone 50 MG tablet   Commonly known as:  DEPADE;REVIA   Stopped by:  Bony Edward MD                Where to get your medicines      These medications were sent to [id number] - [place], [place] - [address]  [address] [address]     Phone:  [phone]     ofloxacin 0.3 % ophthalmic solution                Primary Care Provider Office Phone # Fax #    Liberty Mays [phone]       [address]  [facility] [address]        Equal Access to Services     SONIA SIMONS : Hadii aad ku hadmadihatodd Soradhaali, wamelanieda luqadaha, qaybta kaalmada erin, genevieve villalobos. So [facility] [phone].    ATENCIÓN: Si habla español, tiene a shankar disposición servicios gratuitos de asistencia lingüística. Jeisoname al [phone].    We comply with applicable federal civil rights laws and [place] laws. We do not discriminate on the basis of race, color, national origin, age, disability, sex, sexual orientation, or gender identity.            Thank you!     Thank you for choosing [facility]  for your care. Our goal is always to provide you with excellent care. Hearing back from our patients is one way we can continue to improve our services. Please take a few minutes to complete the written survey that you may receive in the mail after your visit with us. Thank you!             Your Updated Medication List - Protect others around you: Learn how to safely use, store and throw away your medicines at www.disposemymeds.org.          This list is accurate as of 10/20/18  9:58 AM.  Always use your most recent med list.                   Brand Name Dispense Instructions for use Diagnosis    multivitamin, therapeutic Tabs tablet      Take 1 tablet by mouth daily        ofloxacin 0.3 % ophthalmic solution    OCUFLOX    1 mL    Place 1 drop into the right eye 4 times daily for 5 days    Abrasion of right cornea, initial encounter       traZODone 50 MG tablet    DESYREL    90 tablet    Take 1 tablet (50 mg) by mouth nightly as needed for sleep    Primary insomnia       ZOLOFT PO      Take by mouth daily

## 2019-02-18 ENCOUNTER — HOSPITAL ENCOUNTER (EMERGENCY)
Facility: CLINIC | Age: 34
Discharge: HOME | End: 2019-02-19
Attending: EMERGENCY MEDICINE | Admitting: EMERGENCY MEDICINE
Payer: COMMERCIAL

## 2019-02-18 DIAGNOSIS — F10.920 ALCOHOLIC INTOXICATION WITHOUT COMPLICATION (H): ICD-10-CM

## 2019-02-18 LAB — ALCOHOL BREATH TEST: 0.4 (ref 0–0.01)

## 2019-02-18 PROCEDURE — 99283 EMERGENCY DEPT VISIT LOW MDM: CPT | Performed by: EMERGENCY MEDICINE

## 2019-02-18 PROCEDURE — 99283 EMERGENCY DEPT VISIT LOW MDM: CPT | Mod: Z6 | Performed by: EMERGENCY MEDICINE

## 2019-02-18 PROCEDURE — 82075 ASSAY OF BREATH ETHANOL: CPT | Performed by: EMERGENCY MEDICINE

## 2019-02-18 RX ORDER — NICOTINE POLACRILEX 4 MG/1
20 GUM, CHEWING ORAL DAILY
Status: ON HOLD | COMMUNITY
End: 2022-01-23

## 2019-02-18 ASSESSMENT — MIFFLIN-ST. JEOR: SCORE: 1178.96

## 2019-02-18 NOTE — ED AVS SNAPSHOT
[facility], [place], Emergency Department  [address]  [facility] [address]  Phone:  [phone]  Fax:  [phone]                                    Tiffany Vargas   MRN: [id number]    Department:  [facility], Emergency Department   Date of Visit:  2/18/2019           After Visit Summary Signature Page    I have received my discharge instructions, and my questions have been answered. I have discussed any challenges I see with this plan with the nurse or doctor.    ..........................................................................................................................................  Patient/Patient Representative Signature      ..........................................................................................................................................  Patient Representative Print Name and Relationship to Patient    ..................................................               ................................................  Date                                   Time    ..........................................................................................................................................  Reviewed by Signature/Title    ...................................................              ..............................................  Date                                               Time          22EPIC Rev 08/18

## 2019-02-19 VITALS
TEMPERATURE: 96.1 F | WEIGHT: 110 LBS | BODY MASS INDEX: 18.78 KG/M2 | SYSTOLIC BLOOD PRESSURE: 104 MMHG | DIASTOLIC BLOOD PRESSURE: 63 MMHG | HEIGHT: 64 IN | RESPIRATION RATE: 16 BRPM | HEART RATE: 105 BPM | OXYGEN SATURATION: 96 %

## 2019-02-19 ASSESSMENT — ENCOUNTER SYMPTOMS
COUGH: 0
NAUSEA: 0
FEVER: 0
DIARRHEA: 0
SHORTNESS OF BREATH: 0
VOMITING: 0
ABDOMINAL PAIN: 0

## 2019-02-19 NOTE — ED NOTES
"Nurse went to do assessment and found the patient very agitated and wanting to leave. Patient was yelling and swearing stating  \"get all this negros out of here\" Patient was explained to that she is on hold and for her to leave she has to be medically cleared by a doctor. Patient was asked to go back to her room. I will approach her later for assessment after she has calmed down  "
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
## 2019-02-19 NOTE — ED NOTES
Patient has authorized Alesia Hernandez, friend to receive telephone and other updates about her. She can be reached at [phone].
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
## 2019-02-19 NOTE — ED NOTES
Patient continues to come out of room not listening to staff, verbally aggressive. Demanding to leave.

## 2019-02-19 NOTE — DISCHARGE INSTRUCTIONS
Reach out to your sober resources. Call [phone] if you decide you are interested in detox, to check for inpatient bed availability.

## 2019-02-21 ENCOUNTER — COMMUNICATION - HEALTHEAST (OUTPATIENT)
Dept: SCHEDULING | Facility: CLINIC | Age: 34
End: 2019-02-21
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
## 2021-04-07 ENCOUNTER — AMBULATORY - HEALTHEAST (OUTPATIENT)
Dept: NURSING | Facility: CLINIC | Age: 36
End: 2021-04-07

## 2021-04-28 ENCOUNTER — AMBULATORY - HEALTHEAST (OUTPATIENT)
Dept: NURSING | Facility: CLINIC | Age: 36
End: 2021-04-28

## 2021-06-15 ENCOUNTER — TRANSFERRED RECORDS (OUTPATIENT)
Dept: HEALTH INFORMATION MANAGEMENT | Facility: CLINIC | Age: 36
End: 2021-06-15
Payer: COMMERCIAL

## 2021-06-15 LAB
ABO (EXTERNAL): NORMAL
C TRACH DNA SPEC QL PROBE+SIG AMP: NEGATIVE
HEP C HIM: NORMAL
HEPATITIS B SURFACE ANTIGEN (EXTERNAL): NONREACTIVE
HEPATITIS C ANTIBODY (EXTERNAL): NONREACTIVE
HIV 1&2 EXT: NORMAL
HIV1+2 AB SERPL QL IA: NONREACTIVE
N GONORRHOEA DNA SPEC QL PROBE+SIG AMP: NEGATIVE
RH (EXTERNAL): POSITIVE
RUBELLA ANTIBODY IGG (EXTERNAL): NORMAL
SPECIMEN DESCRIP: NORMAL
SPECIMEN DESCRIPTION: NORMAL
TREPONEMA PALLIDUM ANTIBODY (EXTERNAL): NONREACTIVE
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
## 2021-08-14 ENCOUNTER — HEALTH MAINTENANCE LETTER (OUTPATIENT)
Age: 36
End: 2021-08-14

## 2021-09-22 ENCOUNTER — APPOINTMENT (OUTPATIENT)
Dept: ULTRASOUND IMAGING | Facility: CLINIC | Age: 36
End: 2021-09-22
Attending: OBSTETRICS & GYNECOLOGY
Payer: COMMERCIAL

## 2021-09-22 ENCOUNTER — HOSPITAL ENCOUNTER (OUTPATIENT)
Facility: CLINIC | Age: 36
Discharge: HOME | End: 2021-09-22
Attending: OBSTETRICS & GYNECOLOGY | Admitting: OBSTETRICS & GYNECOLOGY
Payer: COMMERCIAL

## 2021-09-22 VITALS
DIASTOLIC BLOOD PRESSURE: 50 MMHG | SYSTOLIC BLOOD PRESSURE: 98 MMHG | RESPIRATION RATE: 16 BRPM | TEMPERATURE: 98.3 F | HEART RATE: 75 BPM

## 2021-09-22 PROCEDURE — 76817 TRANSVAGINAL US OBSTETRIC: CPT

## 2021-09-22 PROCEDURE — G0463 HOSPITAL OUTPT CLINIC VISIT: HCPCS

## 2021-09-22 RX ORDER — PROCHLORPERAZINE MALEATE 5 MG
10 TABLET ORAL EVERY 6 HOURS PRN
Status: DISCONTINUED | OUTPATIENT
Start: 2021-09-22 | End: 2021-09-22 | Stop reason: HOSPADM

## 2021-09-22 RX ORDER — METOCLOPRAMIDE HYDROCHLORIDE 5 MG/ML
10 INJECTION INTRAMUSCULAR; INTRAVENOUS EVERY 6 HOURS PRN
Status: DISCONTINUED | OUTPATIENT
Start: 2021-09-22 | End: 2021-09-22 | Stop reason: HOSPADM

## 2021-09-22 RX ORDER — ONDANSETRON 2 MG/ML
4 INJECTION INTRAMUSCULAR; INTRAVENOUS EVERY 6 HOURS PRN
Status: DISCONTINUED | OUTPATIENT
Start: 2021-09-22 | End: 2021-09-22 | Stop reason: HOSPADM

## 2021-09-22 RX ORDER — PROCHLORPERAZINE 25 MG
25 SUPPOSITORY, RECTAL RECTAL EVERY 12 HOURS PRN
Status: DISCONTINUED | OUTPATIENT
Start: 2021-09-22 | End: 2021-09-22 | Stop reason: HOSPADM

## 2021-09-22 RX ORDER — METOCLOPRAMIDE 10 MG/1
10 TABLET ORAL EVERY 6 HOURS PRN
Status: DISCONTINUED | OUTPATIENT
Start: 2021-09-22 | End: 2021-09-22 | Stop reason: HOSPADM

## 2021-09-22 RX ORDER — ONDANSETRON 4 MG/1
4 TABLET, ORALLY DISINTEGRATING ORAL EVERY 6 HOURS PRN
Status: DISCONTINUED | OUTPATIENT
Start: 2021-09-22 | End: 2021-09-22 | Stop reason: HOSPADM

## 2021-09-22 NOTE — PLAN OF CARE
Tiffany comes to the MAC reporting noticing small amount of vaginal bleeding this am. On her panty liner now there is a very scant amount brownish discharge noted. FHT's 150 with doptone and toco was placed on abdomen. Dr Rodríguez was called and updated. Plan to get ultrasound.

## 2021-09-23 NOTE — DISCHARGE INSTRUCTIONS
Discharge Instruction for Undelivered Patients      You were seen for: Bleeding Assessment  We Consulted: Dr Rodríguez  You had (Test or Medicine):Ultrasound     Diet:   Drink 8 to 12 glasses of liquids (milk, juice, water) every day.  You may eat meals and snacks.     Activity:  Rest the pelvic area. No sex. Do not stimulate breasts or nipples.     Call your provider if you notice:  Swelling in your face or increased swelling in your hands or legs.  Headaches that are not relieved by Tylenol (acetaminophen).  Changes in your vision (blurring: seeing spots or stars.)  Nausea (sick to your stomach) and vomiting (throwing up).   Weight gain of 5 pounds or more per week.  Heartburn that doesn't go away.  Signs of bladder infection: pain when you urinate (use the toilet), need to go more often and more urgently.  The bag of bonner (rupture of membranes) breaks, or you notice leaking in your underwear.  Bright red blood in your underwear.  Abdominal (lower belly) or stomach pain.  For first baby: Contractions (tightening) less than 5 minutes apart for one hour or more.  Second (plus) baby: Contractions (tightening) less than 10 minutes apart and getting stronger.  *If less than 34 weeks: Contractions (tightening) more than 6 times in one hour.  Increase or change in vaginal discharge (note the color and amount)      Follow-up:  Make appointment to be seen in clinic within a week from today Wednesday September 22, 2021

## 2021-09-23 NOTE — PLAN OF CARE
Pt and  discharged to self care. PT and  agree with POC. Discharge instructions reviewed, questions encouraged and answered, read back performed. Pt and  discharged ambulatory and in stable condition.

## 2021-10-09 ENCOUNTER — HEALTH MAINTENANCE LETTER (OUTPATIENT)
Age: 36
End: 2021-10-09

## 2021-12-20 ENCOUNTER — TRANSFERRED RECORDS (OUTPATIENT)
Dept: HEALTH INFORMATION MANAGEMENT | Facility: CLINIC | Age: 36
End: 2021-12-20
Payer: COMMERCIAL

## 2022-01-17 DIAGNOSIS — O09.513 ADVANCED MATERNAL AGE, 1ST PREGNANCY, THIRD TRIMESTER: ICD-10-CM

## 2022-01-20 ENCOUNTER — LAB (OUTPATIENT)
Dept: LAB | Facility: CLINIC | Age: 37
End: 2022-01-20
Attending: OBSTETRICS & GYNECOLOGY
Payer: COMMERCIAL

## 2022-01-20 PROCEDURE — U0003 INFECTIOUS AGENT DETECTION BY NUCLEIC ACID (DNA OR RNA); SEVERE ACUTE RESPIRATORY SYNDROME CORONAVIRUS 2 (SARS-COV-2) (CORONAVIRUS DISEASE [COVID-19]), AMPLIFIED PROBE TECHNIQUE, MAKING USE OF HIGH THROUGHPUT TECHNOLOGIES AS DESCRIBED BY CMS-2020-01-R: HCPCS

## 2022-01-20 PROCEDURE — U0005 INFEC AGEN DETEC AMPLI PROBE: HCPCS

## 2022-01-21 LAB — SARS-COV-2 RNA RESP QL NAA+PROBE: NEGATIVE

## 2022-01-23 ENCOUNTER — HOSPITAL ENCOUNTER (INPATIENT)
Facility: CLINIC | Age: 37
LOS: 3 days | Discharge: HOME | End: 2022-01-26
Attending: OBSTETRICS & GYNECOLOGY | Admitting: OBSTETRICS & GYNECOLOGY
Payer: COMMERCIAL

## 2022-01-23 DIAGNOSIS — Z98.891 S/P C-SECTION: ICD-10-CM

## 2022-01-23 LAB
ABO/RH(D): NORMAL
ANTIBODY SCREEN: NEGATIVE
BASOPHILS # BLD MANUAL: 0.1 10E3/UL (ref 0–0.2)
BASOPHILS NFR BLD MANUAL: 1 %
DACRYOCYTES BLD QL SMEAR: SLIGHT
EOSINOPHIL # BLD MANUAL: 0.1 10E3/UL (ref 0–0.7)
EOSINOPHIL NFR BLD MANUAL: 1 %
ERYTHROCYTE [DISTWIDTH] IN BLOOD BY AUTOMATED COUNT: 15.3 % (ref 10–15)
HCT VFR BLD AUTO: 31.9 % (ref 35–47)
HGB BLD-MCNC: 9.8 G/DL (ref 11.7–15.7)
LYMPHOCYTES # BLD MANUAL: 2 10E3/UL (ref 0.8–5.3)
LYMPHOCYTES NFR BLD MANUAL: 22 %
MCH RBC QN AUTO: 20.5 PG (ref 26.5–33)
MCHC RBC AUTO-ENTMCNC: 30.7 G/DL (ref 31.5–36.5)
MCV RBC AUTO: 67 FL (ref 78–100)
MONOCYTES # BLD MANUAL: 0.1 10E3/UL (ref 0–1.3)
MONOCYTES NFR BLD MANUAL: 1 %
NEUTROPHILS # BLD MANUAL: 6.8 10E3/UL (ref 1.6–8.3)
NEUTROPHILS NFR BLD MANUAL: 75 %
PLAT MORPH BLD: ABNORMAL
PLATELET # BLD AUTO: 172 10E3/UL (ref 150–450)
RBC # BLD AUTO: 4.78 10E6/UL (ref 3.8–5.2)
RBC MORPH BLD: ABNORMAL
SPECIMEN EXPIRATION DATE: NORMAL
WBC # BLD AUTO: 9.1 10E3/UL (ref 4–11)

## 2022-01-23 PROCEDURE — 250N000013 HC RX MED GY IP 250 OP 250 PS 637: Performed by: OBSTETRICS & GYNECOLOGY

## 2022-01-23 PROCEDURE — 85027 COMPLETE CBC AUTOMATED: CPT | Performed by: OBSTETRICS & GYNECOLOGY

## 2022-01-23 PROCEDURE — 120N000001 HC R&B MED SURG/OB

## 2022-01-23 PROCEDURE — 86780 TREPONEMA PALLIDUM: CPT | Performed by: OBSTETRICS & GYNECOLOGY

## 2022-01-23 PROCEDURE — 86901 BLOOD TYPING SEROLOGIC RH(D): CPT | Performed by: OBSTETRICS & GYNECOLOGY

## 2022-01-23 RX ORDER — FENTANYL CITRATE 50 UG/ML
50-100 INJECTION, SOLUTION INTRAMUSCULAR; INTRAVENOUS
Status: DISCONTINUED | OUTPATIENT
Start: 2022-01-23 | End: 2022-01-24

## 2022-01-23 RX ORDER — OXYTOCIN 10 [USP'U]/ML
10 INJECTION, SOLUTION INTRAMUSCULAR; INTRAVENOUS
Status: DISCONTINUED | OUTPATIENT
Start: 2022-01-23 | End: 2022-01-24

## 2022-01-23 RX ORDER — MISOPROSTOL 200 UG/1
400 TABLET ORAL
Status: DISCONTINUED | OUTPATIENT
Start: 2022-01-23 | End: 2022-01-24

## 2022-01-23 RX ORDER — NALOXONE HYDROCHLORIDE 0.4 MG/ML
0.4 INJECTION, SOLUTION INTRAMUSCULAR; INTRAVENOUS; SUBCUTANEOUS
Status: DISCONTINUED | OUTPATIENT
Start: 2022-01-23 | End: 2022-01-24

## 2022-01-23 RX ORDER — HYDROXYZINE HYDROCHLORIDE 50 MG/1
50 TABLET, FILM COATED ORAL
Status: DISCONTINUED | OUTPATIENT
Start: 2022-01-23 | End: 2022-01-24

## 2022-01-23 RX ORDER — PROCHLORPERAZINE MALEATE 5 MG
10 TABLET ORAL EVERY 6 HOURS PRN
Status: DISCONTINUED | OUTPATIENT
Start: 2022-01-23 | End: 2022-01-24

## 2022-01-23 RX ORDER — ACETAMINOPHEN 325 MG/1
650 TABLET ORAL EVERY 4 HOURS PRN
Status: DISCONTINUED | OUTPATIENT
Start: 2022-01-23 | End: 2022-01-26 | Stop reason: HOSPADM

## 2022-01-23 RX ORDER — OXYTOCIN/0.9 % SODIUM CHLORIDE 30/500 ML
100-340 PLASTIC BAG, INJECTION (ML) INTRAVENOUS CONTINUOUS PRN
Status: DISCONTINUED | OUTPATIENT
Start: 2022-01-23 | End: 2022-01-24

## 2022-01-23 RX ORDER — METHYLERGONOVINE MALEATE 0.2 MG/ML
200 INJECTION INTRAVENOUS
Status: DISCONTINUED | OUTPATIENT
Start: 2022-01-23 | End: 2022-01-24

## 2022-01-23 RX ORDER — IBUPROFEN 600 MG/1
600 TABLET, FILM COATED ORAL
Status: DISCONTINUED | OUTPATIENT
Start: 2022-01-23 | End: 2022-01-24

## 2022-01-23 RX ORDER — PROCHLORPERAZINE 25 MG
25 SUPPOSITORY, RECTAL RECTAL EVERY 12 HOURS PRN
Status: DISCONTINUED | OUTPATIENT
Start: 2022-01-23 | End: 2022-01-24

## 2022-01-23 RX ORDER — NALOXONE HYDROCHLORIDE 0.4 MG/ML
0.2 INJECTION, SOLUTION INTRAMUSCULAR; INTRAVENOUS; SUBCUTANEOUS
Status: DISCONTINUED | OUTPATIENT
Start: 2022-01-23 | End: 2022-01-24

## 2022-01-23 RX ORDER — CARBOPROST TROMETHAMINE 250 UG/ML
250 INJECTION, SOLUTION INTRAMUSCULAR
Status: DISCONTINUED | OUTPATIENT
Start: 2022-01-23 | End: 2022-01-24

## 2022-01-23 RX ORDER — METOCLOPRAMIDE HYDROCHLORIDE 5 MG/ML
10 INJECTION INTRAMUSCULAR; INTRAVENOUS EVERY 6 HOURS PRN
Status: DISCONTINUED | OUTPATIENT
Start: 2022-01-23 | End: 2022-01-24

## 2022-01-23 RX ORDER — MISOPROSTOL 200 UG/1
800 TABLET ORAL
Status: DISCONTINUED | OUTPATIENT
Start: 2022-01-23 | End: 2022-01-24

## 2022-01-23 RX ORDER — METOCLOPRAMIDE 10 MG/1
10 TABLET ORAL EVERY 6 HOURS PRN
Status: DISCONTINUED | OUTPATIENT
Start: 2022-01-23 | End: 2022-01-24

## 2022-01-23 RX ORDER — KETOROLAC TROMETHAMINE 30 MG/ML
30 INJECTION, SOLUTION INTRAMUSCULAR; INTRAVENOUS
Status: DISCONTINUED | OUTPATIENT
Start: 2022-01-23 | End: 2022-01-24

## 2022-01-23 RX ORDER — OXYTOCIN/0.9 % SODIUM CHLORIDE 30/500 ML
340 PLASTIC BAG, INJECTION (ML) INTRAVENOUS CONTINUOUS PRN
Status: DISCONTINUED | OUTPATIENT
Start: 2022-01-23 | End: 2022-01-24

## 2022-01-23 RX ORDER — TRANEXAMIC ACID 10 MG/ML
1 INJECTION, SOLUTION INTRAVENOUS EVERY 30 MIN PRN
Status: DISCONTINUED | OUTPATIENT
Start: 2022-01-23 | End: 2022-01-24

## 2022-01-23 RX ORDER — MISOPROSTOL 100 UG/1
25 TABLET ORAL EVERY 4 HOURS PRN
Status: DISCONTINUED | OUTPATIENT
Start: 2022-01-23 | End: 2022-01-24

## 2022-01-23 RX ORDER — ONDANSETRON 2 MG/ML
4 INJECTION INTRAMUSCULAR; INTRAVENOUS EVERY 6 HOURS PRN
Status: DISCONTINUED | OUTPATIENT
Start: 2022-01-23 | End: 2022-01-24

## 2022-01-23 RX ORDER — ONDANSETRON 4 MG/1
4 TABLET, ORALLY DISINTEGRATING ORAL EVERY 6 HOURS PRN
Status: DISCONTINUED | OUTPATIENT
Start: 2022-01-23 | End: 2022-01-24

## 2022-01-23 RX ADMIN — HYDROXYZINE HYDROCHLORIDE 50 MG: 50 TABLET, FILM COATED ORAL at 21:26

## 2022-01-23 RX ADMIN — HYDROXYZINE HYDROCHLORIDE 50 MG: 50 TABLET, FILM COATED ORAL at 22:57

## 2022-01-23 RX ADMIN — MISOPROSTOL 25 MCG: 100 TABLET ORAL at 20:53

## 2022-01-23 RX ADMIN — ACETAMINOPHEN 650 MG: 325 TABLET, FILM COATED ORAL at 22:57

## 2022-01-24 ENCOUNTER — ANESTHESIA EVENT (OUTPATIENT)
Dept: OBGYN | Facility: CLINIC | Age: 37
End: 2022-01-24
Payer: COMMERCIAL

## 2022-01-24 ENCOUNTER — ANESTHESIA (OUTPATIENT)
Dept: OBGYN | Facility: CLINIC | Age: 37
End: 2022-01-24
Payer: COMMERCIAL

## 2022-01-24 LAB — T PALLIDUM AB SER QL: NONREACTIVE

## 2022-01-24 PROCEDURE — 370N000017 HC ANESTHESIA TECHNICAL FEE, PER MIN: Performed by: OBSTETRICS & GYNECOLOGY

## 2022-01-24 PROCEDURE — 250N000013 HC RX MED GY IP 250 OP 250 PS 637: Performed by: OBSTETRICS & GYNECOLOGY

## 2022-01-24 PROCEDURE — 258N000003 HC RX IP 258 OP 636: Performed by: NURSE ANESTHETIST, CERTIFIED REGISTERED

## 2022-01-24 PROCEDURE — 250N000009 HC RX 250: Performed by: ANESTHESIOLOGY

## 2022-01-24 PROCEDURE — 3E0P7VZ INTRODUCTION OF HORMONE INTO FEMALE REPRODUCTIVE, VIA NATURAL OR ARTIFICIAL OPENING: ICD-10-PCS | Performed by: OBSTETRICS & GYNECOLOGY

## 2022-01-24 PROCEDURE — 250N000011 HC RX IP 250 OP 636: Performed by: ANESTHESIOLOGY

## 2022-01-24 PROCEDURE — 250N000011 HC RX IP 250 OP 636: Performed by: OBSTETRICS & GYNECOLOGY

## 2022-01-24 PROCEDURE — 250N000013 HC RX MED GY IP 250 OP 250 PS 637

## 2022-01-24 PROCEDURE — 250N000009 HC RX 250: Performed by: OBSTETRICS & GYNECOLOGY

## 2022-01-24 PROCEDURE — 250N000011 HC RX IP 250 OP 636: Performed by: NURSE ANESTHETIST, CERTIFIED REGISTERED

## 2022-01-24 PROCEDURE — 710N000009 HC RECOVERY PHASE 1, LEVEL 1, PER MIN: Performed by: OBSTETRICS & GYNECOLOGY

## 2022-01-24 PROCEDURE — 272N000001 HC OR GENERAL SUPPLY STERILE: Performed by: OBSTETRICS & GYNECOLOGY

## 2022-01-24 PROCEDURE — 258N000003 HC RX IP 258 OP 636: Performed by: OBSTETRICS & GYNECOLOGY

## 2022-01-24 PROCEDURE — 250N000011 HC RX IP 250 OP 636

## 2022-01-24 PROCEDURE — 120N000012 HC R&B POSTPARTUM

## 2022-01-24 PROCEDURE — 360N000076 HC SURGERY LEVEL 3, PER MIN: Performed by: OBSTETRICS & GYNECOLOGY

## 2022-01-24 RX ORDER — PROCHLORPERAZINE 25 MG
25 SUPPOSITORY, RECTAL RECTAL EVERY 12 HOURS PRN
Status: DISCONTINUED | OUTPATIENT
Start: 2022-01-24 | End: 2022-01-26 | Stop reason: HOSPADM

## 2022-01-24 RX ORDER — METHYLERGONOVINE MALEATE 0.2 MG/ML
200 INJECTION INTRAVENOUS
Status: DISCONTINUED | OUTPATIENT
Start: 2022-01-24 | End: 2022-01-26 | Stop reason: HOSPADM

## 2022-01-24 RX ORDER — EPHEDRINE SULFATE 50 MG/ML
5 INJECTION, SOLUTION INTRAMUSCULAR; INTRAVENOUS; SUBCUTANEOUS
Status: DISCONTINUED | OUTPATIENT
Start: 2022-01-24 | End: 2022-01-24

## 2022-01-24 RX ORDER — AMOXICILLIN 250 MG
2 CAPSULE ORAL 2 TIMES DAILY
Status: DISCONTINUED | OUTPATIENT
Start: 2022-01-24 | End: 2022-01-26 | Stop reason: HOSPADM

## 2022-01-24 RX ORDER — OXYTOCIN 10 [USP'U]/ML
10 INJECTION, SOLUTION INTRAMUSCULAR; INTRAVENOUS
Status: DISCONTINUED | OUTPATIENT
Start: 2022-01-24 | End: 2022-01-26 | Stop reason: HOSPADM

## 2022-01-24 RX ORDER — CEFAZOLIN SODIUM 2 G/100ML
2 INJECTION, SOLUTION INTRAVENOUS SEE ADMIN INSTRUCTIONS
Status: DISCONTINUED | OUTPATIENT
Start: 2022-01-24 | End: 2022-01-24

## 2022-01-24 RX ORDER — LORAZEPAM 2 MG/ML
INJECTION INTRAMUSCULAR
Status: COMPLETED
Start: 2022-01-24 | End: 2022-01-24

## 2022-01-24 RX ORDER — IBUPROFEN 400 MG/1
800 TABLET, FILM COATED ORAL EVERY 6 HOURS
Status: DISCONTINUED | OUTPATIENT
Start: 2022-01-25 | End: 2022-01-26 | Stop reason: HOSPADM

## 2022-01-24 RX ORDER — OXYTOCIN 10 [USP'U]/ML
10 INJECTION, SOLUTION INTRAMUSCULAR; INTRAVENOUS
Status: DISCONTINUED | OUTPATIENT
Start: 2022-01-24 | End: 2022-01-24

## 2022-01-24 RX ORDER — ACETAMINOPHEN 325 MG/1
975 TABLET ORAL ONCE
Status: DISCONTINUED | OUTPATIENT
Start: 2022-01-24 | End: 2022-01-24

## 2022-01-24 RX ORDER — NALOXONE HYDROCHLORIDE 0.4 MG/ML
0.4 INJECTION, SOLUTION INTRAMUSCULAR; INTRAVENOUS; SUBCUTANEOUS
Status: DISCONTINUED | OUTPATIENT
Start: 2022-01-24 | End: 2022-01-26 | Stop reason: HOSPADM

## 2022-01-24 RX ORDER — NALBUPHINE HYDROCHLORIDE 10 MG/ML
2.5-5 INJECTION, SOLUTION INTRAMUSCULAR; INTRAVENOUS; SUBCUTANEOUS EVERY 6 HOURS PRN
Status: DISCONTINUED | OUTPATIENT
Start: 2022-01-24 | End: 2022-01-24

## 2022-01-24 RX ORDER — ROPIVACAINE HYDROCHLORIDE 2 MG/ML
INJECTION, SOLUTION EPIDURAL; INFILTRATION; PERINEURAL
Status: COMPLETED | OUTPATIENT
Start: 2022-01-24 | End: 2022-01-24

## 2022-01-24 RX ORDER — METHYLERGONOVINE MALEATE 0.2 MG/ML
200 INJECTION INTRAVENOUS
Status: DISCONTINUED | OUTPATIENT
Start: 2022-01-24 | End: 2022-01-24

## 2022-01-24 RX ORDER — MODIFIED LANOLIN
OINTMENT (GRAM) TOPICAL
Status: DISCONTINUED | OUTPATIENT
Start: 2022-01-24 | End: 2022-01-26 | Stop reason: HOSPADM

## 2022-01-24 RX ORDER — FENTANYL CITRATE-0.9 % NACL/PF 10 MCG/ML
100 PLASTIC BAG, INJECTION (ML) INTRAVENOUS EVERY 5 MIN PRN
Status: DISCONTINUED | OUTPATIENT
Start: 2022-01-24 | End: 2022-01-24 | Stop reason: HOSPADM

## 2022-01-24 RX ORDER — ACETAMINOPHEN 325 MG/1
975 TABLET ORAL EVERY 6 HOURS
Status: DISCONTINUED | OUTPATIENT
Start: 2022-01-24 | End: 2022-01-26 | Stop reason: HOSPADM

## 2022-01-24 RX ORDER — CEFAZOLIN SODIUM/WATER 2 G/20 ML
SYRINGE (ML) INTRAVENOUS
Status: DISCONTINUED
Start: 2022-01-24 | End: 2022-01-24 | Stop reason: HOSPADM

## 2022-01-24 RX ORDER — MISOPROSTOL 200 UG/1
400 TABLET ORAL
Status: DISCONTINUED | OUTPATIENT
Start: 2022-01-24 | End: 2022-01-26 | Stop reason: HOSPADM

## 2022-01-24 RX ORDER — AZITHROMYCIN 500 MG/1
500 INJECTION, POWDER, LYOPHILIZED, FOR SOLUTION INTRAVENOUS
Status: COMPLETED | OUTPATIENT
Start: 2022-01-24 | End: 2022-01-24

## 2022-01-24 RX ORDER — BISACODYL 10 MG
10 SUPPOSITORY, RECTAL RECTAL DAILY PRN
Status: DISCONTINUED | OUTPATIENT
Start: 2022-01-26 | End: 2022-01-26 | Stop reason: HOSPADM

## 2022-01-24 RX ORDER — NALOXONE HYDROCHLORIDE 0.4 MG/ML
0.2 INJECTION, SOLUTION INTRAMUSCULAR; INTRAVENOUS; SUBCUTANEOUS
Status: DISCONTINUED | OUTPATIENT
Start: 2022-01-24 | End: 2022-01-26 | Stop reason: HOSPADM

## 2022-01-24 RX ORDER — METOCLOPRAMIDE HYDROCHLORIDE 5 MG/ML
10 INJECTION INTRAMUSCULAR; INTRAVENOUS EVERY 6 HOURS PRN
Status: DISCONTINUED | OUTPATIENT
Start: 2022-01-24 | End: 2022-01-26 | Stop reason: HOSPADM

## 2022-01-24 RX ORDER — OXYCODONE HYDROCHLORIDE 5 MG/1
5 TABLET ORAL EVERY 4 HOURS PRN
Status: DISCONTINUED | OUTPATIENT
Start: 2022-01-24 | End: 2022-01-26 | Stop reason: HOSPADM

## 2022-01-24 RX ORDER — PROCHLORPERAZINE MALEATE 10 MG
10 TABLET ORAL EVERY 6 HOURS PRN
Status: DISCONTINUED | OUTPATIENT
Start: 2022-01-24 | End: 2022-01-26 | Stop reason: HOSPADM

## 2022-01-24 RX ORDER — TRANEXAMIC ACID 10 MG/ML
1 INJECTION, SOLUTION INTRAVENOUS EVERY 30 MIN PRN
Status: DISCONTINUED | OUTPATIENT
Start: 2022-01-24 | End: 2022-01-26 | Stop reason: HOSPADM

## 2022-01-24 RX ORDER — CEFAZOLIN SODIUM 2 G/100ML
2 INJECTION, SOLUTION INTRAVENOUS
Status: COMPLETED | OUTPATIENT
Start: 2022-01-24 | End: 2022-01-24

## 2022-01-24 RX ORDER — ACETAMINOPHEN 325 MG/1
TABLET ORAL
Status: DISCONTINUED
Start: 2022-01-24 | End: 2022-01-24 | Stop reason: HOSPADM

## 2022-01-24 RX ORDER — AMOXICILLIN 250 MG
1 CAPSULE ORAL 2 TIMES DAILY
Status: DISCONTINUED | OUTPATIENT
Start: 2022-01-24 | End: 2022-01-26 | Stop reason: HOSPADM

## 2022-01-24 RX ORDER — LIDOCAINE HYDROCHLORIDE AND EPINEPHRINE BITARTRATE 20; .01 MG/ML; MG/ML
INJECTION, SOLUTION SUBCUTANEOUS PRN
Status: DISCONTINUED | OUTPATIENT
Start: 2022-01-24 | End: 2022-01-24

## 2022-01-24 RX ORDER — ROPIVACAINE HYDROCHLORIDE 2 MG/ML
10 INJECTION, SOLUTION EPIDURAL; INFILTRATION; PERINEURAL ONCE
Status: DISCONTINUED | OUTPATIENT
Start: 2022-01-24 | End: 2022-01-24

## 2022-01-24 RX ORDER — METOCLOPRAMIDE 10 MG/1
10 TABLET ORAL EVERY 6 HOURS PRN
Status: DISCONTINUED | OUTPATIENT
Start: 2022-01-24 | End: 2022-01-26 | Stop reason: HOSPADM

## 2022-01-24 RX ORDER — SIMETHICONE 80 MG
80 TABLET,CHEWABLE ORAL 4 TIMES DAILY PRN
Status: DISCONTINUED | OUTPATIENT
Start: 2022-01-24 | End: 2022-01-26 | Stop reason: HOSPADM

## 2022-01-24 RX ORDER — KETOROLAC TROMETHAMINE 30 MG/ML
30 INJECTION, SOLUTION INTRAMUSCULAR; INTRAVENOUS EVERY 6 HOURS
Status: COMPLETED | OUTPATIENT
Start: 2022-01-24 | End: 2022-01-25

## 2022-01-24 RX ORDER — OXYTOCIN/0.9 % SODIUM CHLORIDE 30/500 ML
340 PLASTIC BAG, INJECTION (ML) INTRAVENOUS CONTINUOUS PRN
Status: DISCONTINUED | OUTPATIENT
Start: 2022-01-24 | End: 2022-01-26 | Stop reason: HOSPADM

## 2022-01-24 RX ORDER — CARBOPROST TROMETHAMINE 250 UG/ML
250 INJECTION, SOLUTION INTRAMUSCULAR
Status: DISCONTINUED | OUTPATIENT
Start: 2022-01-24 | End: 2022-01-26 | Stop reason: HOSPADM

## 2022-01-24 RX ORDER — LIDOCAINE 40 MG/G
CREAM TOPICAL
Status: DISCONTINUED | OUTPATIENT
Start: 2022-01-24 | End: 2022-01-24

## 2022-01-24 RX ORDER — TRANEXAMIC ACID 10 MG/ML
1 INJECTION, SOLUTION INTRAVENOUS EVERY 30 MIN PRN
Status: DISCONTINUED | OUTPATIENT
Start: 2022-01-24 | End: 2022-01-24

## 2022-01-24 RX ORDER — OXYTOCIN/0.9 % SODIUM CHLORIDE 30/500 ML
340 PLASTIC BAG, INJECTION (ML) INTRAVENOUS CONTINUOUS PRN
Status: COMPLETED | OUTPATIENT
Start: 2022-01-24 | End: 2022-01-24

## 2022-01-24 RX ORDER — CARBOPROST TROMETHAMINE 250 UG/ML
250 INJECTION, SOLUTION INTRAMUSCULAR
Status: DISCONTINUED | OUTPATIENT
Start: 2022-01-24 | End: 2022-01-24

## 2022-01-24 RX ORDER — ONDANSETRON 4 MG/1
4 TABLET, ORALLY DISINTEGRATING ORAL EVERY 6 HOURS PRN
Status: DISCONTINUED | OUTPATIENT
Start: 2022-01-24 | End: 2022-01-26 | Stop reason: HOSPADM

## 2022-01-24 RX ORDER — CITRIC ACID/SODIUM CITRATE 334-500MG
30 SOLUTION, ORAL ORAL
Status: DISCONTINUED | OUTPATIENT
Start: 2022-01-24 | End: 2022-01-24

## 2022-01-24 RX ORDER — AZITHROMYCIN 500 MG/1
INJECTION, POWDER, LYOPHILIZED, FOR SOLUTION INTRAVENOUS
Status: COMPLETED
Start: 2022-01-24 | End: 2022-01-24

## 2022-01-24 RX ORDER — MISOPROSTOL 200 UG/1
800 TABLET ORAL
Status: DISCONTINUED | OUTPATIENT
Start: 2022-01-24 | End: 2022-01-26 | Stop reason: HOSPADM

## 2022-01-24 RX ORDER — ONDANSETRON 2 MG/ML
4 INJECTION INTRAMUSCULAR; INTRAVENOUS EVERY 6 HOURS PRN
Status: DISCONTINUED | OUTPATIENT
Start: 2022-01-24 | End: 2022-01-26 | Stop reason: HOSPADM

## 2022-01-24 RX ORDER — OXYTOCIN/0.9 % SODIUM CHLORIDE 30/500 ML
100-340 PLASTIC BAG, INJECTION (ML) INTRAVENOUS CONTINUOUS PRN
Status: DISCONTINUED | OUTPATIENT
Start: 2022-01-24 | End: 2022-01-24

## 2022-01-24 RX ORDER — SODIUM CHLORIDE, SODIUM LACTATE, POTASSIUM CHLORIDE, CALCIUM CHLORIDE 600; 310; 30; 20 MG/100ML; MG/100ML; MG/100ML; MG/100ML
INJECTION, SOLUTION INTRAVENOUS CONTINUOUS
Status: DISCONTINUED | OUTPATIENT
Start: 2022-01-24 | End: 2022-01-24

## 2022-01-24 RX ORDER — LORAZEPAM 2 MG/ML
0.5 INJECTION INTRAMUSCULAR ONCE
Status: COMPLETED | OUTPATIENT
Start: 2022-01-24 | End: 2022-01-24

## 2022-01-24 RX ORDER — LIDOCAINE 40 MG/G
CREAM TOPICAL
Status: DISCONTINUED | OUTPATIENT
Start: 2022-01-24 | End: 2022-01-26 | Stop reason: HOSPADM

## 2022-01-24 RX ORDER — DEXTROSE, SODIUM CHLORIDE, SODIUM LACTATE, POTASSIUM CHLORIDE, AND CALCIUM CHLORIDE 5; .6; .31; .03; .02 G/100ML; G/100ML; G/100ML; G/100ML; G/100ML
INJECTION, SOLUTION INTRAVENOUS CONTINUOUS
Status: DISCONTINUED | OUTPATIENT
Start: 2022-01-24 | End: 2022-01-26 | Stop reason: HOSPADM

## 2022-01-24 RX ORDER — HYDROCORTISONE 2.5 %
CREAM (GRAM) TOPICAL 3 TIMES DAILY PRN
Status: DISCONTINUED | OUTPATIENT
Start: 2022-01-24 | End: 2022-01-26 | Stop reason: HOSPADM

## 2022-01-24 RX ORDER — MISOPROSTOL 200 UG/1
400 TABLET ORAL
Status: DISCONTINUED | OUTPATIENT
Start: 2022-01-24 | End: 2022-01-24

## 2022-01-24 RX ORDER — MISOPROSTOL 200 UG/1
800 TABLET ORAL
Status: DISCONTINUED | OUTPATIENT
Start: 2022-01-24 | End: 2022-01-24

## 2022-01-24 RX ORDER — MORPHINE SULFATE 1 MG/ML
INJECTION, SOLUTION EPIDURAL; INTRATHECAL; INTRAVENOUS PRN
Status: DISCONTINUED | OUTPATIENT
Start: 2022-01-24 | End: 2022-01-24

## 2022-01-24 RX ADMIN — FENTANYL CITRATE 100 MCG: 50 INJECTION, SOLUTION INTRAMUSCULAR; INTRAVENOUS at 04:25

## 2022-01-24 RX ADMIN — SODIUM CHLORIDE, POTASSIUM CHLORIDE, SODIUM LACTATE AND CALCIUM CHLORIDE: 600; 310; 30; 20 INJECTION, SOLUTION INTRAVENOUS at 15:57

## 2022-01-24 RX ADMIN — CEFAZOLIN SODIUM 2 G: 2 INJECTION, SOLUTION INTRAVENOUS at 16:37

## 2022-01-24 RX ADMIN — MORPHINE SULFATE 3.5 MG: 1 INJECTION, SOLUTION EPIDURAL; INTRATHECAL; INTRAVENOUS at 16:53

## 2022-01-24 RX ADMIN — ACETAMINOPHEN: 325 TABLET, FILM COATED ORAL at 16:30

## 2022-01-24 RX ADMIN — FENTANYL CITRATE 100 MCG: 50 INJECTION, SOLUTION INTRAMUSCULAR; INTRAVENOUS at 12:38

## 2022-01-24 RX ADMIN — Medication: at 14:05

## 2022-01-24 RX ADMIN — MISOPROSTOL 25 MCG: 100 TABLET ORAL at 10:00

## 2022-01-24 RX ADMIN — LIDOCAINE HYDROCHLORIDE AND EPINEPHRINE BITARTRATE 10 ML: 20; .01 INJECTION, SOLUTION SUBCUTANEOUS at 16:37

## 2022-01-24 RX ADMIN — PHENYLEPHRINE HYDROCHLORIDE 0.4 MCG/KG/MIN: 10 INJECTION INTRAVENOUS at 16:45

## 2022-01-24 RX ADMIN — LORAZEPAM 0.5 MG: 2 INJECTION INTRAMUSCULAR; INTRAVENOUS at 17:54

## 2022-01-24 RX ADMIN — PHENYLEPHRINE HYDROCHLORIDE 200 MCG: 10 INJECTION INTRAVENOUS at 17:26

## 2022-01-24 RX ADMIN — LIDOCAINE HYDROCHLORIDE AND EPINEPHRINE BITARTRATE 10 ML: 20; .01 INJECTION, SOLUTION SUBCUTANEOUS at 16:39

## 2022-01-24 RX ADMIN — MISOPROSTOL 25 MCG: 100 TABLET ORAL at 06:08

## 2022-01-24 RX ADMIN — KETOROLAC TROMETHAMINE 30 MG: 30 INJECTION, SOLUTION INTRAMUSCULAR; INTRAVENOUS at 23:17

## 2022-01-24 RX ADMIN — Medication 340 ML/HR: at 16:51

## 2022-01-24 RX ADMIN — PHENYLEPHRINE HYDROCHLORIDE 100 MCG: 10 INJECTION INTRAVENOUS at 17:00

## 2022-01-24 RX ADMIN — SODIUM CHLORIDE, SODIUM LACTATE, POTASSIUM CHLORIDE, CALCIUM CHLORIDE AND DEXTROSE MONOHYDRATE: 5; 600; 310; 30; 20 INJECTION, SOLUTION INTRAVENOUS at 21:14

## 2022-01-24 RX ADMIN — ONDANSETRON 4 MG: 2 INJECTION INTRAMUSCULAR; INTRAVENOUS at 16:46

## 2022-01-24 RX ADMIN — KETOROLAC TROMETHAMINE 30 MG: 30 INJECTION, SOLUTION INTRAMUSCULAR at 17:18

## 2022-01-24 RX ADMIN — FENTANYL CITRATE 100 MCG: 50 INJECTION, SOLUTION INTRAMUSCULAR; INTRAVENOUS at 11:36

## 2022-01-24 RX ADMIN — MISOPROSTOL 25 MCG: 100 TABLET ORAL at 01:07

## 2022-01-24 RX ADMIN — LORAZEPAM 0.5 MG: 2 INJECTION INTRAMUSCULAR at 17:54

## 2022-01-24 RX ADMIN — SODIUM CHLORIDE, POTASSIUM CHLORIDE, SODIUM LACTATE AND CALCIUM CHLORIDE 1000 ML: 600; 310; 30; 20 INJECTION, SOLUTION INTRAVENOUS at 13:12

## 2022-01-24 RX ADMIN — AZITHROMYCIN MONOHYDRATE 500 MG: 500 INJECTION, POWDER, LYOPHILIZED, FOR SOLUTION INTRAVENOUS at 16:43

## 2022-01-24 RX ADMIN — ROPIVACAINE HYDROCHLORIDE 10 ML: 2 INJECTION, SOLUTION EPIDURAL; INFILTRATION at 14:10

## 2022-01-24 RX ADMIN — SERTRALINE HYDROCHLORIDE 150 MG: 100 TABLET ORAL at 07:48

## 2022-01-24 ASSESSMENT — ACTIVITIES OF DAILY LIVING (ADL)
ADLS_ACUITY_SCORE: 4

## 2022-01-24 NOTE — ANESTHESIA CARE TRANSFER NOTE
Patient: Tiffany Vargas    Procedure: Procedure(s):   SECTION       Diagnosis: * No pre-op diagnosis entered *  Diagnosis Additional Information: No value filed.    Anesthesia Type:   Epidural     Note:    Oropharynx: oropharynx clear of all foreign objects and spontaneously breathing  Level of Consciousness: awake  Oxygen Supplementation: room air    Independent Airway: airway patency satisfactory and stable  Dentition: dentition unchanged  Vital Signs Stable: post-procedure vital signs reviewed and stable  Report to RN Given: handoff report given  Patient transferred to: Labor and Delivery    Handoff Report: Identifed the Patient, Identified the Reponsible Provider, Reviewed the pertinent medical history, Discussed the surgical course, Reviewed Intra-OP anesthesia mangement and issues during anesthesia, Set expectations for post-procedure period and Allowed opportunity for questions and acknowledgement of understanding      Vitals:  Vitals Value Taken Time   BP     Temp     Pulse     Resp     SpO2         Electronically Signed By: JULIA Cheng CRNA  2022  5:37 PM

## 2022-01-24 NOTE — ANESTHESIA PROCEDURE NOTES
Epidural catheter Procedure Note    Pre-Procedure   Staff -        Anesthesiologist:  Jacoby Vasquez MD       Performed By: Anesthesiologist       Location: pre-op       Pre-Anesthestic Checklist: patient identified, IV checked, site marked, risks and benefits discussed, informed consent, monitors and equipment checked, pre-op evaluation, at physician/surgeon's request and post-op pain management  Timeout:       Correct Patient: Yes        Correct Procedure: Yes        Correct Site: Yes        Correct Position: Yes   Procedure Documentation  Procedure: epidural catheter       Patient Position: sitting       Patient Prep/Sterile Barriers: sterile gloves, mask, patient draped       Skin prep: Betadine       Local skin infiltrated with 3 mL of 1% lidocaine.        Insertion Site: L3-4. (midline approach).       Technique: LORT saline        EVERTON at 5 cm.       Needle Type: ToQuantaporey needle       Needle Gauge: 17.        Needle Length (Inches): 3.5        Catheter: 19 G.         Catheter threaded easily.         3 cm epidural space.         Threaded 8 cm at skin.        # of attempts: 1 and  # of redirects: : 0.    Assessment/Narrative         Paresthesias: No.      Test dose of 3 mL lidocaine 1.5% w/ 1:200,000 epinephrine at.         Test dose negative, 3 minutes after injection, for signs of intravascular, subdural, or intrathecal injection.       Insertion/Infusion Method: LORT saline       Aspiration negative for Heme or CSF via Epidural Catheter.    Medication(s) Administered   0.2% Ropivacaine (Epidural), 10 mL  Medication Administration Time: 1/24/2022 2:10 PM     Comments:  Pt tolerated well.   Taped sterile and secure.   Ropivacaine bolus (documented separately in MAR) done >3 minutes after test dose, in increments, with intermittent negative aspirations.    FHTs stable after the procedure.   No complications.
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
## 2022-01-24 NOTE — OP NOTE
"Date of Procedure: 22    Preoperative Diagnosis:   1. IUP at 39w0d  2. Category 2 FHT - recurrent late decelerations  3. AMA  4. Depression, on zoloft    Postoperative Diagnosis:   Same as above plus    Surgeon: Dr. Angie Blanton MD     Procedure: Primary Low Transverse  Section     Anesthesia Staff: Anesthesiologist: Jacoby Vasquez MD; Clyde Johnson MD; Wanda Austin MD  CRNA: Qian Mae APRN CRNA  OR Staff: Nurse: Heather Metcalf RN  Circulator: Oscar Meneses RN  Scrub Person: Maria E Correa  Baby : Sofie Davis RN           Anesthesia: Epidural    Antibiotics: ancef, azithromycin     Quantitative Blood Loss:  725 mL               Total IV Fluids: 600 mL    Urine Output: 350 mL, clear yellow    Findings: VMI \"Louis\", cephalic presentation, clear fluid, double nuchal cord, 3-vessel cord, Apgars 8/9 at 1 and 5 minutes respectively, Normal uterus, normal tubes and ovaries, No adhesions           Specimens: cord gases  cord blood  placenta                       Complications:  None; patient tolerated the procedure well.    Indications: Patient is a 36 year old female who presented to Labor and Delivery at 39w0d for IOL for AMA. She had NIPS which revealed low risk and male; AFP was normal. Anatomy US was WNL. She passed her 1 hour gtt. Her pregnancy has been complicated by beta thalassemia minor last hgb 10.9. She has a history of depression and is stable on zoloft.     She presented last night for CR/IOL for AMA. She has received cytotec x3.  She then continue to labor spontaneously. She received an epidural for pain management. She had two prolonged spontaneous decelerations x7-8 minutes. She then continued to have recurrent late decelerations despite intrauterine resuscitation. Her cervix was only 4 cm.      The  section was indicated due to NRFHT, category 2 remote from delivery. R/B/A were discussed, and consents were signed.     Procedure Details: "   The patient was seen in the Labor and Delivery Area prior to surgery. The risks, benefits, complications, treatment options, non-operative alternatives, expected recovery and outcomes including failure rates and expected outcomes were discussed with the patient. The possibilities of reaction to medication, pulmonary aspiration, injury to surrounding structures, bleeding, recurrent infections, the need for additional procedures, failure to diagnose a condition, creating a complication requiring transfusion or operation were discussed with the patient. The patient concurred with the proposed plan, giving informed consent. Pre-operative antibiotics were given. Venous thrombosis prophylaxis has been ordered, including bilateral SCDs.     The patient was taken to the operating room. Epidural was dosed appropriately for surgery. She was then placed in dorsal supine position with leftward tilt. Serrato catheter was in place. Fetal heart tones were noted to be baseline 150 with late decelerations before proceeding. She was then prepped and draped in the usual sterile fashion. A timeout was performed. A vaginal prep was completed with betadine. Anesthesia was found to be adequate.     A scalpel was then used to make a Pfannenstiel incision and carried down to the underlying fascia. A transverse incision was made in the fascia, and the fascial incision was extended transversely. Sharp and blunt dissection was used to separate the rectus muscle from fascia superiorly and inferiorly. The rectus muscles were then divided in the midline and the peritoneum was identified and entered bluntly. The peritoneal incision was extended and the bladder blade was placed for visualization. The utero-vesical peritoneal reflection was incised transversely and the bladder flap was created. A low transverse hysterotomy incision was made in the lower uterine segment and extended cephalocaudal and bluntly. The fetus was delivered atraumatically.  The fetus was male. After the umbilical cord was clamped and cut, the infant was handed to the awaiting  RN. Cord pH and cord blood were obtained. The placenta was then expressed intact. The uterus was  exteriorized. The uterus was then cleared of the remaining clots and debris. The hysterotomy incision was then closed with double layer closure using 0 vicryl. Hemostasis was noted. The uterus was replaced into the abdominal cavity. Bilateral pericolic gutters were cleared of clot and debris. The rectus muscles and fascia were inspected and hemostasis was obtained. The fascia was then closed in a running fashion using 0 vicryl. The subcutaneous tissue was irrigated and hemostasis was assured. The skin was closed with subcuticular stitches using 4-0 monocryl. Dermal glue and an island dressing was applied. The vagina was cleared of clot.     Sponge, instrument, and needle counts were correct x2 as reported to the surgeon.    The patient and infant were in stable condition.  She was taken to the PACU in stable condition. Infant went to NBN.      Amena Blanton MD  [phone]  22 5:28 PM

## 2022-01-24 NOTE — PLAN OF CARE
Data:  Patient is a . Prenatal record reviewed.   OB History    Para Term  AB Living   1 0 0 0 0 0   SAB IAB Ectopic Multiple Live Births   0 0 0 0 0      # Outcome Date GA Lbr Reddy/2nd Weight Sex Delivery Anes PTL Lv   1 Current            .  Medical History:   Past Medical History:   Diagnosis Date     Anxiety      Depressive disorder     on zoloft   .  Gestational age 39w0d. Vital signs per doc flowsheet. Fetal movement present. Patient reports Induction Of Labor   as reason for admission. Support persons Mario present.  Action: Report from DANIELLE Mckenzie obtained at 0720. Care of patient assumed at 0720. Verbal consent for EFM, external fetal monitors applied. Admission assessment completed. Patient and support persons educated on labor process. Patient instructed to report change in fetal movement, contractions, vaginal leaking of fluid or bleeding, abdominal pain, or any concerns related to the pregnancy to her nurse/physician. Patient oriented to room, call light in reach.   Response: Dr. Blanton informed of SVE and carlson score. Plan per provider is continue vaginal cytotec q4hrs today. Patient verbalized understanding of education and verbalized agreement with plan.      22 0755   Provider Notification   Provider Name/Title Dr Blanton    Method of Notification At Bedside   Request Evaluate in Person   Notification Reason Status Update;Labor Status  (updated, poc cont vag cytotec today )      22 1000   Vaginal Exam   Method sterile exam per RN   Cervical Dilation (cm) 1   Cervical Effacement 60%   Fetal Station -2   Carlson Score   Cervical Position 0-->posterior   Cervical Consistency 2-->soft   Dilation (cm) 1-->1 cm   Effacement (%) 2-->50-70%   Fetal Station 1-->-2   Carlson Score 6     Fentanyl given  22 1135   Vaginal Exam   Method sterile exam per RN   Cervical Dilation (cm) 1-2         Fentanyl given 22 1231   Vaginal Exam   Method sterile exam per RN   Cervical Dilation  (cm) 2   Cervical Effacement 80-90%   Fetal Station -1      22 1232   Provider Notification   Provider Name/Title Dr Blanton   Method of Notification Phone   Request Evaluate - Remote   Notification Reason SVE;Status Update  (2/80-90/-1, 2 min prolong, getting epidural, then start pit )        22 1433   Vaginal Exam   Method sterile exam per RN   Cervical Dilation (cm) 3   Cervical Effacement 90%   Fetal Station -1      22 1351   Provider Notification   Provider Name/Title Dr Vasquez    Method of Notification At Bedside   Request Evaluate in Person   Notification Reason Pain  (epidural )      22 1431   Fetal Assessment   Fetal HR Decelerations prolonged  (8 min  prolong, repositioning multiple times, IV bolus, in-house and MD called )      22 1439   Provider Notification   Provider Name/Title in-house   Method of Notification At Bedside   Request Evaluate in Person   Notification Reason Decels  (at bedside for prolong )      22 1442   Provider Notification   Provider Name/Title Dr Blanton    Method of Notification At Bedside   Request Evaluate in Person   Notification Reason Decels  (at bedside for 8 min prolong )      22 1530   Fetal Assessment   Fetal HR Decelerations prolonged  (md aware)      22 1550   Provider Notification   Provider Name/Title Dr Blanton    Method of Notification Phone   Request Evaluate - Remote   Notification Reason Decels  (recurrent prolongs, plan for c/s, md coming to bedside )      22 1611   Provider Notification   Provider Name/Title Dr Blanton   Method of Notification At Bedside   Request Evaluate in Person   Notification Reason   (consent for c/s at 1630)     1650 baby boy born via c/s, apgars 8/9. Baby received all the  medications and did fair at the breast with a nipple shield    Data: Tiffany Vargas transferred to [facility] via wheelchair. Baby transferred via parent's arms.  Action: Receiving unit notified of transfer: Yes. Patient  and family notified of room change. Report given to Malathi. Belongings sent to receiving unit. Accompanied by Registered Nurse. Oriented patient to surroundings. Call light within reach. ID bands double-checked with receiving RN.  Response: Patient tolerated transfer and is stable.

## 2022-01-24 NOTE — PROGRESS NOTES
LABOR PROGRESS NOTE    S:  Called to room for prolonged decel at about 3pm, resolved with intrauterine resuscitative efforts. Pitocin never started. Now at 415 pm called again for recurrent late decelerations and to room. She is comfortable with epidural.     O:   /61   Temp 98.1  F (36.7  C) (Tympanic)   Resp 16     FHTs: 150/moderate/-accel/+decel: recurrent late decelerations with slow return to baseline.  TOCO: q 4-6 minutes though difficult to trace    SVE:   4 cm    A/P:  36 year old  at 39w0d  1.   IUP: Category 2 FHT - recurrent late decelerations remote from delivery. Recommend  delivery.   2. R/B/A discussed and consent signed.     Amena Blanton MD  [phone]  22 4:31 PM

## 2022-01-25 VITALS
RESPIRATION RATE: 16 BRPM | DIASTOLIC BLOOD PRESSURE: 74 MMHG | HEART RATE: 80 BPM | TEMPERATURE: 98 F | SYSTOLIC BLOOD PRESSURE: 112 MMHG | OXYGEN SATURATION: 98 %

## 2022-01-25 LAB — HGB BLD-MCNC: 7.8 G/DL (ref 11.7–15.7)

## 2022-01-25 PROCEDURE — 250N000011 HC RX IP 250 OP 636: Performed by: OBSTETRICS & GYNECOLOGY

## 2022-01-25 PROCEDURE — 250N000013 HC RX MED GY IP 250 OP 250 PS 637: Performed by: OBSTETRICS & GYNECOLOGY

## 2022-01-25 PROCEDURE — 85018 HEMOGLOBIN: CPT | Performed by: OBSTETRICS & GYNECOLOGY

## 2022-01-25 PROCEDURE — 250N000013 HC RX MED GY IP 250 OP 250 PS 637: Performed by: STUDENT IN AN ORGANIZED HEALTH CARE EDUCATION/TRAINING PROGRAM

## 2022-01-25 PROCEDURE — 120N000012 HC R&B POSTPARTUM

## 2022-01-25 PROCEDURE — 36415 COLL VENOUS BLD VENIPUNCTURE: CPT | Performed by: OBSTETRICS & GYNECOLOGY

## 2022-01-25 RX ORDER — DIPHENHYDRAMINE HCL 25 MG
25-50 CAPSULE ORAL EVERY 8 HOURS PRN
Status: DISCONTINUED | OUTPATIENT
Start: 2022-01-25 | End: 2022-01-26 | Stop reason: HOSPADM

## 2022-01-25 RX ORDER — PANTOPRAZOLE SODIUM 40 MG/1
40 TABLET, DELAYED RELEASE ORAL
Status: DISCONTINUED | OUTPATIENT
Start: 2022-01-25 | End: 2022-01-26 | Stop reason: HOSPADM

## 2022-01-25 RX ADMIN — DIPHENHYDRAMINE HYDROCHLORIDE 25 MG: 25 CAPSULE ORAL at 05:44

## 2022-01-25 RX ADMIN — KETOROLAC TROMETHAMINE 30 MG: 30 INJECTION, SOLUTION INTRAMUSCULAR; INTRAVENOUS at 12:38

## 2022-01-25 RX ADMIN — ACETAMINOPHEN 975 MG: 325 TABLET, FILM COATED ORAL at 06:14

## 2022-01-25 RX ADMIN — OXYCODONE HYDROCHLORIDE 5 MG: 5 TABLET ORAL at 21:42

## 2022-01-25 RX ADMIN — IBUPROFEN 800 MG: 400 TABLET, FILM COATED ORAL at 18:33

## 2022-01-25 RX ADMIN — SENNOSIDES AND DOCUSATE SODIUM 1 TABLET: 50; 8.6 TABLET ORAL at 22:44

## 2022-01-25 RX ADMIN — ACETAMINOPHEN 975 MG: 325 TABLET, FILM COATED ORAL at 16:47

## 2022-01-25 RX ADMIN — KETOROLAC TROMETHAMINE 30 MG: 30 INJECTION, SOLUTION INTRAMUSCULAR; INTRAVENOUS at 06:15

## 2022-01-25 RX ADMIN — SERTRALINE HYDROCHLORIDE 150 MG: 100 TABLET ORAL at 10:55

## 2022-01-25 RX ADMIN — DIPHENHYDRAMINE HYDROCHLORIDE 25 MG: 25 CAPSULE ORAL at 14:02

## 2022-01-25 RX ADMIN — SENNOSIDES AND DOCUSATE SODIUM 1 TABLET: 50; 8.6 TABLET ORAL at 10:55

## 2022-01-25 RX ADMIN — PANTOPRAZOLE SODIUM 40 MG: 40 TABLET, DELAYED RELEASE ORAL at 14:02

## 2022-01-25 RX ADMIN — ACETAMINOPHEN 975 MG: 325 TABLET, FILM COATED ORAL at 00:32

## 2022-01-25 RX ADMIN — ACETAMINOPHEN 975 MG: 325 TABLET, FILM COATED ORAL at 22:44

## 2022-01-25 RX ADMIN — TRAZODONE HYDROCHLORIDE 25 MG: 50 TABLET ORAL at 23:36

## 2022-01-25 RX ADMIN — OXYCODONE HYDROCHLORIDE 5 MG: 5 TABLET ORAL at 14:32

## 2022-01-25 ASSESSMENT — ACTIVITIES OF DAILY LIVING (ADL)
ADLS_ACUITY_SCORE: 4

## 2022-01-25 NOTE — PLAN OF CARE
Patient arrived from Labor and Delivery at 1930. Vital signs stable. Postpartum assessment WDL. Incision clean, dry, and intact. Pain controlled with Tylenol/Toradol. Patient ambulating with standby assist. Patient passing gas. Serrato removed at 0350, patient due to void. Breastfeeding on cue with assist. Patient mentioned plan to bottle feed  at times. Parents aware of call light and safe sleep for . ID bands double checked with L&D Nurse. Patient and infant bonding well. Will continue with current plan of care.

## 2022-01-25 NOTE — PLAN OF CARE
Vital signs stable. Postpartum assessment WDL. Incision clean, dry and intact, dressing removed and noted a small amount of bruising above incision. Rates pain 5-7/10, moving without difficulty, but feels pain may be getting worse through the day. Offered and gave oxycodone and will recheck to see if pain is better controlled. Using ice to incision and giving tylenol and toradol/ibuprofen. Ambulating independently including in hallways and showered today. Tolerating regular diet and fluids. Brisa has been voiding in small amounts all day. She's been firm at U, midline. Bladder scanned x2 for around 200 each time. Encouraged to push fluids and continue to measure urine output until sure she's emptying fully.    Discussed feeding plan with Brisa and offered options. She shared she plans to pump and bottle feed at home and doesn't want to try to keep latching baby. Offered support and encouraged her to start pumping. Pump set up and she's pumped twice today.

## 2022-01-25 NOTE — ANESTHESIA POSTPROCEDURE EVALUATION
Patient: Tiffany Vargas    Procedure: Procedure(s):   SECTION       Diagnosis:* No pre-op diagnosis entered *  Diagnosis Additional Information: No value filed.    Anesthesia Type:  Epidural    Note:  Disposition: Inpatient   Postop Pain Control: Uneventful            Sign Out: Well controlled pain   PONV: No   Neuro/Psych: Uneventful            Sign Out: Acceptable/Baseline neuro status   Airway/Respiratory: Uneventful            Sign Out: Acceptable/Baseline resp. status   CV/Hemodynamics: Uneventful            Sign Out: Acceptable CV status; No obvious hypovolemia; No obvious fluid overload   Other NRE: NONE   DID A NON-ROUTINE EVENT OCCUR? No     Epidural-to- Updated ASA: 2 Emergent      Last vitals:  Vitals Value Taken Time   /75 22 1800   Temp     Pulse 71 22 1809   Resp 5 22 1809   SpO2 98 % 22 1809   Vitals shown include unvalidated device data.    Electronically Signed By: Clyde Johnson MD  2022  6:42 PM

## 2022-01-25 NOTE — PLAN OF CARE
Pt VSS, voiding in good amounts.  FF and flow minimal, no clots.  Incision CDI, bruising around the incision noted.   Pt is planning to pump and bottle, formula feeding with bottle. Pain well controlled with prescribed pain medications. Independent with cares of self and infant in the room.  SO at bedside and supportive.  No concerns at this time, will continue to monitor.

## 2022-01-25 NOTE — PROVIDER NOTIFICATION
Dr. Hatch text paged regarding hemoglobin of 7.8. Patient up ambulating independently, showered today, no dizziness.

## 2022-01-26 PROCEDURE — 250N000013 HC RX MED GY IP 250 OP 250 PS 637: Performed by: STUDENT IN AN ORGANIZED HEALTH CARE EDUCATION/TRAINING PROGRAM

## 2022-01-26 PROCEDURE — 250N000013 HC RX MED GY IP 250 OP 250 PS 637: Performed by: OBSTETRICS & GYNECOLOGY

## 2022-01-26 RX ORDER — AMOXICILLIN 250 MG
1 CAPSULE ORAL 2 TIMES DAILY
Qty: 60 TABLET | Refills: 0 | Status: SHIPPED | OUTPATIENT
Start: 2022-01-26

## 2022-01-26 RX ORDER — OXYCODONE HYDROCHLORIDE 5 MG/1
5 TABLET ORAL EVERY 4 HOURS PRN
Qty: 10 TABLET | Refills: 0 | Status: SHIPPED | OUTPATIENT
Start: 2022-01-26

## 2022-01-26 RX ADMIN — SENNOSIDES AND DOCUSATE SODIUM 1 TABLET: 50; 8.6 TABLET ORAL at 08:04

## 2022-01-26 RX ADMIN — IBUPROFEN 800 MG: 400 TABLET, FILM COATED ORAL at 06:13

## 2022-01-26 RX ADMIN — PANTOPRAZOLE SODIUM 40 MG: 40 TABLET, DELAYED RELEASE ORAL at 08:02

## 2022-01-26 RX ADMIN — IBUPROFEN 800 MG: 400 TABLET, FILM COATED ORAL at 00:44

## 2022-01-26 RX ADMIN — ACETAMINOPHEN 975 MG: 325 TABLET, FILM COATED ORAL at 11:40

## 2022-01-26 RX ADMIN — SERTRALINE HYDROCHLORIDE 150 MG: 100 TABLET ORAL at 08:02

## 2022-01-26 RX ADMIN — ACETAMINOPHEN 975 MG: 325 TABLET, FILM COATED ORAL at 04:47

## 2022-01-26 RX ADMIN — OXYCODONE HYDROCHLORIDE 5 MG: 5 TABLET ORAL at 10:19

## 2022-01-26 RX ADMIN — Medication 1 TABLET: at 10:19

## 2022-01-26 ASSESSMENT — ACTIVITIES OF DAILY LIVING (ADL)
ADLS_ACUITY_SCORE: 4

## 2022-01-26 NOTE — PLAN OF CARE
Vital signs stable. Postpartum assessment WDL. Incision clean, dry, and intact- edema and bruising noted. Pain controlled with Tylenol/Ibuprofen/Oxycodone. Patient ambulating independently. Patient passing gas. Mother pumping and bottle feeding formula. PRN Trazodone given. Patient and infant bonding well. Will continue with current plan of care.

## 2022-01-26 NOTE — DISCHARGE SUMMARY
[facility] Discharge Summary    Tiffany Vargas MRN# [id number]   Age: 36 year old YOB: 1985     Date of Admission:  2022  Date of Discharge::  2022  Admitting Physician:  Amena Blanton MD  Discharge Physician:  Norman Rodríguez MD               Admission Diagnoses:   1. IUP at 39w0d  2. Category 2 FHT - recurrent late decelerations  3. AMA  4. Depression, on zoloft  5. Beta thalasemia            Discharge Diagnosis:   6. IUP at 39w0d  7. Category 2 FHT - recurrent late decelerations  8. AMA  9. Depression, on zoloft  10. Beta thalasemia  11. ABL anemia on chronic anemia          Procedures:   Procedure(s): Primary low transverse  section       No other procedures performed during this admission           Medications Prior to Admission:     Medications Prior to Admission   Medication Sig Dispense Refill Last Dose     sertraline (ZOLOFT) 50 MG tablet Take 150 mg by mouth daily    2022 at Unknown time     traZODone (DESYREL) 50 MG tablet Take 1 tablet (50 mg) by mouth nightly as needed for sleep (Patient taking differently: Take 25 mg by mouth At Bedtime ) 90 tablet 1 2022 at Unknown time             Discharge Medications:     Current Discharge Medication List      START taking these medications    Details   ferrous fumarate-vitamin C ER (TOMAS-SEQUELS) 65-25 MG CR tablet Take 1 tablet by mouth daily (with breakfast)  Qty: 30 tablet, Refills: 0    Associated Diagnoses: S/P       oxyCODONE (ROXICODONE) 5 MG tablet Take 1 tablet (5 mg) by mouth every 4 hours as needed  Qty: 10 tablet, Refills: 0    Associated Diagnoses: S/P       senna-docusate (SENOKOT-S/PERICOLACE) 8.6-50 MG tablet Take 1 tablet by mouth 2 times daily  Qty: 60 tablet, Refills: 0    Associated Diagnoses: S/P          CONTINUE these medications which have NOT CHANGED    Details   sertraline (ZOLOFT) 50 MG tablet Take 150 mg by mouth daily       traZODone (DESYREL)  50 MG tablet Take 1 tablet (50 mg) by mouth nightly as needed for sleep  Qty: 90 tablet, Refills: 1    Associated Diagnoses: Primary insomnia                   Consultations:   No consultations were requested during this admission          Brief History of Labor or Admission:   Admitted for IOL and underwent PLTCS for fetal intolerance to labor           Hospital Course:   The patient's hospital course was unremarkable.  She recovered as anticipated and experienced no post-operative complications.  On discharge, her pain was well controlled. Vaginal bleeding is similar to peak menstrual flow.  Voiding without difficulty.  Ambulating well and tolerating a normal diet.  No fever or significant wound drainage.  Breastfeeding well.  Infant is stable.  No bowel movement yet.  She was discharged on post-partum day #2.    Post-partum hemoglobin:   Hemoglobin   Date Value Ref Range Status   01/25/2022 7.8 (L) 11.7 - 15.7 g/dL Final             Discharge Instructions and Follow-Up:   Discharge diet: Regular   Discharge activity: No lifting, driving, or strenuous exercise for 6 week(s)   Discharge follow-up: Follow up with Dr. Blanton in 1-2 weeks   Wound care: Drink plenty of fluids  Ice to area for comfort  Keep wound clean and dry           Discharge Disposition:   Discharged to home      Attestation:  Amount of time performed on this discharge summary: 10 minutes.    Norman Rodríguez MD

## 2022-01-26 NOTE — PROGRESS NOTES
Post Partum Progress Note -  Section    Subjective:   The patient feels well, has no complaints.  Ambulating:  yes  Voiding: yes  Passing flatus: yes, no BM yet  Tolerating regular diet: yes  Pain well controlled: yes.   Lochia: normal    Infant status: well  Feeding via bottle, formula but trying to latch for feedings. .    Objective:     Vitals:    22 1400 22 1432 22 1644 22 2248   BP:   117/80 112/74   BP Location:    Right arm   Pulse:   76 80   Resp: 16 16 16 16   Temp:   98.3  F (36.8  C) 98  F (36.7  C)   TempSrc:   Temporal Oral   SpO2: 98% 98%         Physical Exam:  General: No acute distress, alert and oriented X 3.   Heart: RRR, No murmurs, rubs or gallop.   Lungs: Clear breath sounds bilaterally.  Adequate effort.   Abdomen:  Soft, firm fundus at umbilicus. Appropriately tender.     Incision  c/d/i dermabond   Breast No erythema.  No evidence of infection   Extremities no calf tenderness; edema: none   Perineum N/A   Lochia  None/Light       Assessment & Plan   Tiffany Vargas is a 36 year old  s/p  Primary LTCD for fetal intolerance to labor at 39w0d on 2022.     1.  Post Operative Day 2  - meeting postop goals  - plan discharge today  2.  Activity:  Encouraged pelvic rest x6 weeks.  3.  Continue post partum care  4.  Beta  thalasemia with baseline anemia- now ABL anemia.  Plan iron supplementation for 2 weeks to get up to baseline hemoglobin.  Plan hemoglobin check and iron stores at 2 week pp f/u  5.  Hx mood d/o. Continue current medications. Pt to call clinic with any mood changes to discuss medication adjustment and/or therapy options.     Norman Rodríguez MD

## 2022-09-17 ENCOUNTER — HEALTH MAINTENANCE LETTER (OUTPATIENT)
Age: 37
End: 2022-09-17

## 2023-10-07 ENCOUNTER — HEALTH MAINTENANCE LETTER (OUTPATIENT)
Age: 38
End: 2023-10-07

## 2024-02-24 ENCOUNTER — HEALTH MAINTENANCE LETTER (OUTPATIENT)
Age: 39
End: 2024-02-24

## 2024-11-30 ENCOUNTER — HEALTH MAINTENANCE LETTER (OUTPATIENT)
Age: 39
End: 2024-11-30

## (undated) DEVICE — STPL SKIN PROXIMATE 35 WIDE PMW35

## (undated) DEVICE — LINEN BABY BLANKET 5434

## (undated) DEVICE — DRSG ABDOMINAL 07 1/2X8" 7197D

## (undated) DEVICE — SOL NACL 0.9% IRRIG 1000ML BOTTLE 07138-09

## (undated) DEVICE — DRAPE SHEET REV FOLD 3/4 9349

## (undated) DEVICE — SUCTION CANISTER MEDIVAC LINER 3000ML W/LID 65651-530

## (undated) DEVICE — PACK C-SECTION LF PL15OTA83B

## (undated) DEVICE — SOL WATER IRRIG 1000ML BOTTLE 07139-09

## (undated) DEVICE — BLADE CLIPPER 4406

## (undated) DEVICE — LIGHT HANDLE X2

## (undated) DEVICE — PREP CHLORAPREP 26ML TINTED ORANGE  260815

## (undated) DEVICE — PAD CHUX UNDERPAD 23X24" 7136

## (undated) DEVICE — LINEN TOWEL PACK X5 5464

## (undated) DEVICE — DRSG KERLIX FLUFFS X5

## (undated) DEVICE — ESU GROUND PAD UNIVERSAL W/O CORD

## (undated) DEVICE — PACK SET-UP STD 9102

## (undated) RX ORDER — LIDOCAINE HCL/EPINEPHRINE/PF 2%-1:200K
VIAL (ML) INJECTION
Status: DISPENSED
Start: 2022-01-24

## (undated) RX ORDER — ONDANSETRON 2 MG/ML
INJECTION INTRAMUSCULAR; INTRAVENOUS
Status: DISPENSED
Start: 2022-01-24

## (undated) RX ORDER — KETOROLAC TROMETHAMINE 30 MG/ML
INJECTION, SOLUTION INTRAMUSCULAR; INTRAVENOUS
Status: DISPENSED
Start: 2022-01-24

## (undated) RX ORDER — OXYTOCIN/0.9 % SODIUM CHLORIDE 30/500 ML
PLASTIC BAG, INJECTION (ML) INTRAVENOUS
Status: DISPENSED
Start: 2022-01-24

## (undated) RX ORDER — MORPHINE SULFATE 1 MG/ML
INJECTION, SOLUTION EPIDURAL; INTRATHECAL; INTRAVENOUS
Status: DISPENSED
Start: 2022-01-24